# Patient Record
Sex: FEMALE | Race: WHITE | Employment: OTHER | URBAN - METROPOLITAN AREA
[De-identification: names, ages, dates, MRNs, and addresses within clinical notes are randomized per-mention and may not be internally consistent; named-entity substitution may affect disease eponyms.]

---

## 2017-01-04 ENCOUNTER — OFFICE VISIT (OUTPATIENT)
Dept: FAMILY MEDICINE CLINIC | Facility: CLINIC | Age: 82
End: 2017-01-04

## 2017-01-04 VITALS
BODY MASS INDEX: 24.49 KG/M2 | DIASTOLIC BLOOD PRESSURE: 53 MMHG | TEMPERATURE: 95 F | WEIGHT: 126.38 LBS | HEIGHT: 60.25 IN | SYSTOLIC BLOOD PRESSURE: 114 MMHG | HEART RATE: 79 BPM

## 2017-01-04 DIAGNOSIS — N30.00 ACUTE CYSTITIS WITHOUT HEMATURIA: Primary | ICD-10-CM

## 2017-01-04 DIAGNOSIS — Q23.1 BICUSPID AORTIC VALVE: ICD-10-CM

## 2017-01-04 DIAGNOSIS — K52.9 IBD (INFLAMMATORY BOWEL DISEASE): ICD-10-CM

## 2017-01-04 DIAGNOSIS — K21.9 GASTROESOPHAGEAL REFLUX DISEASE, ESOPHAGITIS PRESENCE NOT SPECIFIED: ICD-10-CM

## 2017-01-04 DIAGNOSIS — E03.9 ACQUIRED HYPOTHYROIDISM: ICD-10-CM

## 2017-01-04 DIAGNOSIS — I10 ESSENTIAL HYPERTENSION: ICD-10-CM

## 2017-01-04 DIAGNOSIS — Z98.42 HISTORY OF CATARACT SURGERY, LEFT: ICD-10-CM

## 2017-01-04 DIAGNOSIS — K52.839 MICROSCOPIC COLITIS, UNSPECIFIED MICROSCOPIC COLITIS TYPE: ICD-10-CM

## 2017-01-04 DIAGNOSIS — G98.8 NEUROLOGIC DISORDER: ICD-10-CM

## 2017-01-04 DIAGNOSIS — Z98.41 HISTORY OF CATARACT SURGERY, RIGHT: ICD-10-CM

## 2017-01-04 LAB
MULTISTIX LOT#: NORMAL NUMERIC
PH, URINE: 7 (ref 4.5–8)
SPECIFIC GRAVITY: 1.01 (ref 1–1.03)
UROBILINOGEN,SEMI-QN: 0.2 MG/DL (ref 0–1.9)

## 2017-01-04 PROCEDURE — 87086 URINE CULTURE/COLONY COUNT: CPT | Performed by: FAMILY MEDICINE

## 2017-01-04 PROCEDURE — 99204 OFFICE O/P NEW MOD 45 MIN: CPT | Performed by: FAMILY MEDICINE

## 2017-01-04 PROCEDURE — 81003 URINALYSIS AUTO W/O SCOPE: CPT | Performed by: FAMILY MEDICINE

## 2017-01-04 PROCEDURE — 87186 SC STD MICRODIL/AGAR DIL: CPT | Performed by: FAMILY MEDICINE

## 2017-01-04 PROCEDURE — 87077 CULTURE AEROBIC IDENTIFY: CPT | Performed by: FAMILY MEDICINE

## 2017-01-04 RX ORDER — SERTRALINE HYDROCHLORIDE 100 MG/1
100 TABLET, FILM COATED ORAL 2 TIMES DAILY
COMMUNITY
End: 2017-07-16

## 2017-01-04 RX ORDER — LISINOPRIL 20 MG/1
20 TABLET ORAL DAILY
COMMUNITY
End: 2017-01-25

## 2017-01-04 RX ORDER — SULFAMETHOXAZOLE AND TRIMETHOPRIM 800; 160 MG/1; MG/1
1 TABLET ORAL 2 TIMES DAILY
Qty: 14 TABLET | Refills: 0 | Status: SHIPPED | OUTPATIENT
Start: 2017-01-04 | End: 2017-01-11

## 2017-01-04 RX ORDER — CHOLESTYRAMINE 4 G/9G
4 POWDER, FOR SUSPENSION ORAL DAILY
COMMUNITY
End: 2017-11-03

## 2017-01-04 RX ORDER — LOPERAMIDE HYDROCHLORIDE 2 MG/1
2 TABLET ORAL 2 TIMES DAILY PRN
COMMUNITY

## 2017-01-04 RX ORDER — THIAMINE HCL 100 MG
1 TABLET ORAL DAILY
COMMUNITY
End: 2017-08-02

## 2017-01-04 RX ORDER — LOSARTAN POTASSIUM 50 MG/1
50 TABLET ORAL DAILY
COMMUNITY
End: 2017-01-20 | Stop reason: DRUGHIGH

## 2017-01-04 RX ORDER — FLUDROCORTISONE ACETATE 0.1 MG/1
0.1 TABLET ORAL DAILY
COMMUNITY
End: 2017-12-21

## 2017-01-04 RX ORDER — LOSARTAN POTASSIUM AND HYDROCHLOROTHIAZIDE 12.5; 5 MG/1; MG/1
1 TABLET ORAL DAILY
COMMUNITY
End: 2017-02-01 | Stop reason: ALTCHOICE

## 2017-01-04 RX ORDER — DOXEPIN HYDROCHLORIDE 50 MG/1
1 CAPSULE ORAL DAILY
COMMUNITY

## 2017-01-04 RX ORDER — OMEPRAZOLE 40 MG/1
40 CAPSULE, DELAYED RELEASE ORAL DAILY
COMMUNITY
End: 2017-01-25 | Stop reason: DRUGHIGH

## 2017-01-04 RX ORDER — BUDESONIDE 3 MG/1
CAPSULE, COATED PELLETS ORAL
COMMUNITY
End: 2017-10-07

## 2017-01-04 RX ORDER — LEVOTHYROXINE SODIUM 0.05 MG/1
50 TABLET ORAL
COMMUNITY
End: 2017-07-18

## 2017-01-04 NOTE — PROGRESS NOTES
Alva Garay is a 80year old female. HPI:     Patient is accompanied by her , she is also accompanied by her daughter who is well-known to me and is my patient as well.     This is a new patient with multiple medical problems and is new to our by mouth 2 (two) times daily as needed for Diarrhea. Disp:  Rfl:    Cholestyramine 4 GM/DOSE Oral Powder Take 2 g by mouth 2 (two) times daily with meals.  Disp:  Rfl:       Past Medical History   Diagnosis Date   • Essential hypertension 1/7/2017   • Sarita 0.0 - 1.9 mg/dL   NITRITE, URINE pos Negative   LEUKOCYTES small Negative   APPEARANCE  Clear   URINE-COLOR  Yellow   Multistix Lot# [id number] Numeric   Multistix Expiration Date 8/2017 Date   -URINE CULTURE, ROUTINE   Collection Time: 01/04/17  5:12 PM   Res Referral - In Network    6. Microscopic colitis, unspecified microscopic colitis type  As above in #5.  - Gastro Referral - In Network    7. Gastroesophageal reflux disease, esophagitis presence not specified  Continue PPI for now.   We will discuss further

## 2017-01-07 PROBLEM — K21.9 GASTROESOPHAGEAL REFLUX DISEASE: Status: ACTIVE | Noted: 2017-01-07

## 2017-01-07 PROBLEM — E03.9 ACQUIRED HYPOTHYROIDISM: Status: ACTIVE | Noted: 2017-01-07

## 2017-01-07 PROBLEM — K52.9 IBD (INFLAMMATORY BOWEL DISEASE): Status: ACTIVE | Noted: 2017-01-07

## 2017-01-07 PROBLEM — I10 ESSENTIAL HYPERTENSION: Status: ACTIVE | Noted: 2017-01-07

## 2017-01-07 PROBLEM — Q23.1 BICUSPID AORTIC VALVE: Status: ACTIVE | Noted: 2017-01-07

## 2017-01-07 PROBLEM — K52.839 MICROSCOPIC COLITIS: Status: ACTIVE | Noted: 2017-01-07

## 2017-01-09 ENCOUNTER — TELEPHONE (OUTPATIENT)
Dept: FAMILY MEDICINE CLINIC | Facility: CLINIC | Age: 82
End: 2017-01-09

## 2017-01-09 DIAGNOSIS — N30.00 ACUTE CYSTITIS WITHOUT HEMATURIA: Primary | ICD-10-CM

## 2017-01-09 NOTE — TELEPHONE ENCOUNTER
I spoke with Salo Alicia (pt's ) and informed him of test results and recommendations to repeat urine culture 2-3 days after completing antibiotics.   Salo Vargas states that pt will be done with antibiotics tomorrow and that she is feeling better, daughter will

## 2017-01-09 NOTE — TELEPHONE ENCOUNTER
----- Message from Raford Cogan, DO sent at 1/7/2017 12:12 PM CST -----  Please call patient: Urine culture is positive. The antibiotic the patient is on should be working.   Please have patient recheck urine culture 2-3 days after completing full cours

## 2017-01-11 ENCOUNTER — TELEPHONE (OUTPATIENT)
Dept: FAMILY MEDICINE CLINIC | Facility: CLINIC | Age: 82
End: 2017-01-11

## 2017-01-11 DIAGNOSIS — K52.9 IBD (INFLAMMATORY BOWEL DISEASE): ICD-10-CM

## 2017-01-11 DIAGNOSIS — R53.1 GENERALIZED WEAKNESS: Primary | ICD-10-CM

## 2017-01-11 DIAGNOSIS — R29.6 FREQUENT FALLS: ICD-10-CM

## 2017-01-11 DIAGNOSIS — K52.839 MICROSCOPIC COLITIS, UNSPECIFIED MICROSCOPIC COLITIS TYPE: ICD-10-CM

## 2017-01-11 NOTE — TELEPHONE ENCOUNTER
I know that she has a chronic lower extremity weakness due to a neuropathy/myopathy. Which she be willing to have home health come out into a fall risk assessment?

## 2017-01-11 NOTE — TELEPHONE ENCOUNTER
Spoke to pt's daughter, Alisha Rushing she will take her Mom in for the lab work soon. Also, she said she thinks it was the GI doctor that prescribed the fludrocortisone.   She has his/her contact information and will bring that tomorrow when she brings her father

## 2017-01-11 NOTE — TELEPHONE ENCOUNTER
Please check CBC, CMP, B12, iron studies for diagnoses generalized weakness and frequent falls.   What type of specialist has prescribing her fludrocortisone, endocrinologist or neurologist? And has she ever been recommended to take stress doses of it in th

## 2017-01-11 NOTE — TELEPHONE ENCOUNTER
Caregiver told daughter that her mother is falling more frequent and is very weak. She should probably have her hemoglobin tested? Pt is coming in for urine test tomorrow.   They would like to know if we could put a order in for that the blood test?

## 2017-01-11 NOTE — TELEPHONE ENCOUNTER
Spoke to pt's daughter and she was agreeable to the home health evaluation and felt that her Mom would be agreeable as well. Please advise.   Thanks

## 2017-01-13 ENCOUNTER — APPOINTMENT (OUTPATIENT)
Dept: LAB | Age: 82
End: 2017-01-13
Attending: FAMILY MEDICINE
Payer: MEDICARE

## 2017-01-13 ENCOUNTER — LAB ENCOUNTER (OUTPATIENT)
Dept: LAB | Age: 82
End: 2017-01-13
Attending: FAMILY MEDICINE
Payer: MEDICARE

## 2017-01-13 DIAGNOSIS — R53.1 GENERALIZED WEAKNESS: ICD-10-CM

## 2017-01-13 DIAGNOSIS — K52.839 MICROSCOPIC COLITIS, UNSPECIFIED MICROSCOPIC COLITIS TYPE: ICD-10-CM

## 2017-01-13 DIAGNOSIS — K52.9 IBD (INFLAMMATORY BOWEL DISEASE): ICD-10-CM

## 2017-01-13 DIAGNOSIS — R29.6 FREQUENT FALLS: ICD-10-CM

## 2017-01-13 LAB
ALBUMIN SERPL-MCNC: 3.7 G/DL (ref 3.5–4.8)
ALP LIVER SERPL-CCNC: 80 U/L (ref 55–142)
ALT SERPL-CCNC: 23 U/L (ref 14–54)
AST SERPL-CCNC: 29 U/L (ref 15–41)
BASOPHILS # BLD AUTO: 0.02 X10(3) UL (ref 0–0.1)
BASOPHILS NFR BLD AUTO: 0.5 %
BILIRUB SERPL-MCNC: 0.5 MG/DL (ref 0.1–2)
BILIRUB UR QL STRIP.AUTO: NEGATIVE
BUN BLD-MCNC: 41 MG/DL (ref 8–20)
CALCIUM BLD-MCNC: 9.4 MG/DL (ref 8.3–10.3)
CHLORIDE: 97 MMOL/L (ref 101–111)
CO2: 21 MMOL/L (ref 22–32)
COLOR UR AUTO: YELLOW
CREAT BLD-MCNC: 1.82 MG/DL (ref 0.55–1.02)
DEPRECATED HBV CORE AB SER IA-ACNC: 654 NG/ML (ref 10–291)
EOSINOPHIL # BLD AUTO: 0.06 X10(3) UL (ref 0–0.3)
EOSINOPHIL NFR BLD AUTO: 1.4 %
ERYTHROCYTE [DISTWIDTH] IN BLOOD BY AUTOMATED COUNT: 13.7 % (ref 11.5–16)
GLUCOSE BLD-MCNC: 102 MG/DL (ref 70–99)
GLUCOSE UR STRIP.AUTO-MCNC: NEGATIVE MG/DL
HAV AB SERPL IA-ACNC: 271 PG/ML (ref 193–986)
HCT VFR BLD AUTO: 31.7 % (ref 34–50)
HEMOLYSIS: 1
HGB BLD-MCNC: 11.1 G/DL (ref 12–16)
HYALINE CASTS #/AREA URNS AUTO: PRESENT /LPF
ICTERUS: 1
IMMATURE GRANULOCYTE COUNT: 0.03 X10(3) UL (ref 0–1)
IMMATURE GRANULOCYTE RATIO %: 0.7 %
IRON SATURATION: 21 % (ref 13–45)
IRON: 64 UG/DL (ref 28–170)
KETONES UR STRIP.AUTO-MCNC: NEGATIVE MG/DL
LIPEMIA: 1
LYMPHOCYTES # BLD AUTO: 1.07 X10(3) UL (ref 0.9–4)
LYMPHOCYTES NFR BLD AUTO: 24.5 %
M PROTEIN MFR SERPL ELPH: 7.1 G/DL (ref 6.1–8.3)
MCH RBC QN AUTO: 33.6 PG (ref 27–33.2)
MCHC RBC AUTO-ENTMCNC: 35 G/DL (ref 31–37)
MCV RBC AUTO: 96.1 FL (ref 81–100)
MONOCYTES # BLD AUTO: 0.4 X10(3) UL (ref 0.1–0.6)
MONOCYTES NFR BLD AUTO: 9.2 %
NEUTROPHIL ABS PRELIM: 2.78 X10 (3) UL (ref 1.3–6.7)
NEUTROPHILS # BLD AUTO: 2.78 X10(3) UL (ref 1.3–6.7)
NEUTROPHILS NFR BLD AUTO: 63.7 %
NITRITE UR QL STRIP.AUTO: NEGATIVE
PH UR STRIP.AUTO: 6 [PH] (ref 4.5–8)
PLATELET # BLD AUTO: 183 10(3)UL (ref 150–450)
POTASSIUM SERPL-SCNC: 3.2 MMOL/L (ref 3.6–5.1)
PROT UR STRIP.AUTO-MCNC: NEGATIVE MG/DL
RBC # BLD AUTO: 3.3 X10(6)UL (ref 3.8–5.1)
RBC UR QL AUTO: NEGATIVE
RED CELL DISTRIBUTION WIDTH-SD: 48.2 FL (ref 35.1–46.3)
SODIUM SERPL-SCNC: 130 MMOL/L (ref 136–144)
SP GR UR STRIP.AUTO: 1.01 (ref 1–1.03)
TOTAL IRON BINDING CAPACITY: 298 UG/DL (ref 298–536)
TRANSFERRIN: 200 MG/DL (ref 200–360)
UROBILINOGEN UR STRIP.AUTO-MCNC: <2 MG/DL
WBC # BLD AUTO: 4.4 X10(3) UL (ref 4–13)

## 2017-01-13 PROCEDURE — 83540 ASSAY OF IRON: CPT

## 2017-01-13 PROCEDURE — 85025 COMPLETE CBC W/AUTO DIFF WBC: CPT

## 2017-01-13 PROCEDURE — 36415 COLL VENOUS BLD VENIPUNCTURE: CPT

## 2017-01-13 PROCEDURE — 82728 ASSAY OF FERRITIN: CPT

## 2017-01-13 PROCEDURE — 80053 COMPREHEN METABOLIC PANEL: CPT

## 2017-01-13 PROCEDURE — 83550 IRON BINDING TEST: CPT

## 2017-01-13 PROCEDURE — 82607 VITAMIN B-12: CPT

## 2017-01-13 PROCEDURE — 81001 URINALYSIS AUTO W/SCOPE: CPT

## 2017-01-16 ENCOUNTER — TELEPHONE (OUTPATIENT)
Dept: FAMILY MEDICINE CLINIC | Facility: CLINIC | Age: 82
End: 2017-01-16

## 2017-01-16 NOTE — TELEPHONE ENCOUNTER
[facility] calling for a medication reconciliation for the patient  There are several discrepancies between our med list and the med list residential has. 1.Patient not currently taking lisinopril. Family reports that she ran out.   2.Budesonide-family reports pa

## 2017-01-18 ENCOUNTER — TELEPHONE (OUTPATIENT)
Dept: FAMILY MEDICINE CLINIC | Facility: CLINIC | Age: 82
End: 2017-01-18

## 2017-01-18 NOTE — TELEPHONE ENCOUNTER
Esha, physical therapist/[facility] was at the patients home and asked per family request if the test results were available yet and per instructions from Dr. Nico Ortiz pt is to make a follow up appt for her labs because her kidney function is abnormal and her elec

## 2017-01-19 ENCOUNTER — TELEPHONE (OUTPATIENT)
Dept: FAMILY MEDICINE CLINIC | Facility: CLINIC | Age: 82
End: 2017-01-19

## 2017-01-19 NOTE — TELEPHONE ENCOUNTER
Noted.  Will d[facility]w pt at [facility] she has scheduled to see me for 1/25/2017

## 2017-01-19 NOTE — TELEPHONE ENCOUNTER
Future Appointments  Date Time Provider Odell Sidhu   1/20/2017 2:30 PM Aditya Ramsay DO [facility] [place]   1/25/2017 10:00 AM Aditya Ramsay DO [facility] [place]

## 2017-01-19 NOTE — TELEPHONE ENCOUNTER
Please call pt, but also speak to her daughter Cedric:  Why is pt not eating and drinking? Abdominal pain? Nausea? Vomiting? Let me know, we made need to direct her to the ER.

## 2017-01-20 ENCOUNTER — OFFICE VISIT (OUTPATIENT)
Dept: FAMILY MEDICINE CLINIC | Facility: CLINIC | Age: 82
End: 2017-01-20

## 2017-01-20 VITALS — HEART RATE: 72 BPM | DIASTOLIC BLOOD PRESSURE: 62 MMHG | SYSTOLIC BLOOD PRESSURE: 94 MMHG

## 2017-01-20 DIAGNOSIS — R53.1 GENERALIZED WEAKNESS: Primary | ICD-10-CM

## 2017-01-20 DIAGNOSIS — E87.6 HYPOKALEMIA: ICD-10-CM

## 2017-01-20 DIAGNOSIS — N30.00 ACUTE CYSTITIS WITHOUT HEMATURIA: ICD-10-CM

## 2017-01-20 DIAGNOSIS — N18.9 CKD (CHRONIC KIDNEY DISEASE), UNSPECIFIED STAGE: ICD-10-CM

## 2017-01-20 PROCEDURE — 99214 OFFICE O/P EST MOD 30 MIN: CPT | Performed by: FAMILY MEDICINE

## 2017-01-20 RX ORDER — POTASSIUM CHLORIDE 1.5 G/1.77G
20 POWDER, FOR SOLUTION ORAL 2 TIMES DAILY
COMMUNITY
End: 2017-01-25

## 2017-01-20 RX ORDER — CIPROFLOXACIN 500 MG/1
500 TABLET, FILM COATED ORAL 2 TIMES DAILY
Qty: 7 TABLET | Refills: 0 | Status: SHIPPED | OUTPATIENT
Start: 2017-01-20 | End: 2017-01-25 | Stop reason: ALTCHOICE

## 2017-01-20 NOTE — PATIENT INSTRUCTIONS
-- start potassium powder 2 packets 2x/day for next 2 days, then one packet 2x/day every day  -- bring in urine sample tomorrow morning between 8-11am  -- if looks positive, we will have you start the antibiotics  -- if antibiotics started, also start prob

## 2017-01-20 NOTE — PROGRESS NOTES
Lucy Brunson is a 80year old female here for Patient presents with:   Follow - Up: abnormal labs and was told to follow up today      HPI:     ? UTI  -has been having decreased PO intake for past several weeks  -was diagnosed with klebsiella UTI 3 wks Disp: 7 tablet Rfl: 0   Losartan Potassium-HCTZ 50-12.5 MG Oral Tab Take 1 tablet by mouth daily. Disp:  Rfl:    Fludrocortisone Acetate 0.1 MG Oral Tab Take 0.1 mg by mouth daily.  Disp:  Rfl:    Budesonide 3 MG Oral Cap DR Particles Take 3 mg by mouth aguilar PCP    Hypokalemia  -start 40meq BID x 2 days, then 20meq bid     CKD  -unknown baseline, would continue to monitor closely and try to obtain previous records    Followup with PCP as planned mid next week    Meds This Visit:    Signed Prescriptions Disp Re

## 2017-01-21 ENCOUNTER — NURSE ONLY (OUTPATIENT)
Dept: FAMILY MEDICINE CLINIC | Facility: CLINIC | Age: 82
End: 2017-01-21

## 2017-01-21 DIAGNOSIS — Z87.01: Primary | ICD-10-CM

## 2017-01-21 LAB
BILIRUBIN: NEGATIVE
GLUCOSE (URINE DIPSTICK): NEGATIVE MG/DL
KETONES (URINE DIPSTICK): NEGATIVE MG/DL
LEUKOCYTES: NEGATIVE
MULTISTIX LOT#: NORMAL NUMERIC
NITRITE, URINE: NEGATIVE
OCCULT BLOOD: NEGATIVE
PH, URINE: 6.5 (ref 4.5–8)
PROTEIN (URINE DIPSTICK): NEGATIVE MG/DL
SPECIFIC GRAVITY: 1.01 (ref 1–1.03)
UROBILINOGEN,SEMI-QN: 0.2 MG/DL (ref 0–1.9)

## 2017-01-21 PROCEDURE — 87086 URINE CULTURE/COLONY COUNT: CPT | Performed by: FAMILY MEDICINE

## 2017-01-21 PROCEDURE — 81003 URINALYSIS AUTO W/O SCOPE: CPT | Performed by: FAMILY MEDICINE

## 2017-01-21 NOTE — PROGRESS NOTES
The patient's daughter, Dez Villa, dropped off her mother's urine specimen. As the results were negative, Jorge Luis Zuleta conferred with Dr. Darylene Hews about how to proceed with her plan of care. It was determined that the urine would be cultured.   A message was left f

## 2017-01-23 ENCOUNTER — TELEPHONE (OUTPATIENT)
Dept: FAMILY MEDICINE CLINIC | Facility: CLINIC | Age: 82
End: 2017-01-23

## 2017-01-24 ENCOUNTER — TELEPHONE (OUTPATIENT)
Dept: FAMILY MEDICINE CLINIC | Facility: CLINIC | Age: 82
End: 2017-01-24

## 2017-01-24 NOTE — TELEPHONE ENCOUNTER
Jesusita states that the patient has a superficial wound on her coccyx. Good [place] She would like an order to cleanse with wound ,apply a skin prep and cover with foam 2 times weekly.   Office will fax over an order for Dr Janey Weir to sign

## 2017-01-24 NOTE — PROGRESS NOTES
Quick Note:    Please let patient know urine test and culture are negative  -no need to start the antibiotics at this time  -followup with Dr. Maia Mendosa tomorrow as planned  ______

## 2017-01-24 NOTE — TELEPHONE ENCOUNTER
----- Message from Denver Uriostegui MD sent at 1/24/2017  1:51 PM CST -----  Please let patient know urine test and culture are negative  -no need to start the antibiotics at this time  -followup with Dr. Pippa Neri tomorrow as planned

## 2017-01-24 NOTE — TELEPHONE ENCOUNTER
LM on patients answering machine. Spoke with Hermelinda(per signed consent) informing her of negative urine culture. Pt has appt scheduled to follow up with Dr. Adriano Mo in the office tomorrow.

## 2017-01-25 ENCOUNTER — APPOINTMENT (OUTPATIENT)
Dept: LAB | Age: 82
End: 2017-01-25
Attending: FAMILY MEDICINE
Payer: MEDICARE

## 2017-01-25 ENCOUNTER — OFFICE VISIT (OUTPATIENT)
Dept: FAMILY MEDICINE CLINIC | Facility: CLINIC | Age: 82
End: 2017-01-25

## 2017-01-25 VITALS
HEART RATE: 96 BPM | HEIGHT: 60.25 IN | OXYGEN SATURATION: 96 % | SYSTOLIC BLOOD PRESSURE: 102 MMHG | DIASTOLIC BLOOD PRESSURE: 44 MMHG | WEIGHT: 123.38 LBS | BODY MASS INDEX: 23.91 KG/M2

## 2017-01-25 DIAGNOSIS — R63.4 UNINTENDED WEIGHT LOSS: ICD-10-CM

## 2017-01-25 DIAGNOSIS — I10 ESSENTIAL HYPERTENSION: ICD-10-CM

## 2017-01-25 DIAGNOSIS — K52.839 MICROSCOPIC COLITIS, UNSPECIFIED MICROSCOPIC COLITIS TYPE: ICD-10-CM

## 2017-01-25 DIAGNOSIS — E87.6 HYPOKALEMIA: ICD-10-CM

## 2017-01-25 DIAGNOSIS — R63.0 DECREASED APPETITE: ICD-10-CM

## 2017-01-25 DIAGNOSIS — E53.8 B12 DEFICIENCY: ICD-10-CM

## 2017-01-25 DIAGNOSIS — Z00.00 MEDICARE ANNUAL WELLNESS VISIT, INITIAL: Primary | ICD-10-CM

## 2017-01-25 DIAGNOSIS — R79.89 AZOTEMIA: ICD-10-CM

## 2017-01-25 DIAGNOSIS — D64.9 ANEMIA, UNSPECIFIED TYPE: ICD-10-CM

## 2017-01-25 LAB
BUN BLD-MCNC: 19 MG/DL (ref 8–20)
CALCIUM BLD-MCNC: 10 MG/DL (ref 8.3–10.3)
CHLORIDE: 103 MMOL/L (ref 101–111)
CO2: 21 MMOL/L (ref 22–32)
CREAT BLD-MCNC: 1.03 MG/DL (ref 0.55–1.02)
DEPRECATED HBV CORE AB SER IA-ACNC: 620.5 NG/ML (ref 10–291)
GLUCOSE BLD-MCNC: 83 MG/DL (ref 70–99)
HAV IGM SER QL: 1.7 MG/DL (ref 1.7–3)
IRON SATURATION: 22 % (ref 13–45)
IRON: 68 UG/DL (ref 28–170)
POTASSIUM SERPL-SCNC: 5.3 MMOL/L (ref 3.6–5.1)
SODIUM SERPL-SCNC: 132 MMOL/L (ref 136–144)
TOTAL IRON BINDING CAPACITY: 316 UG/DL (ref 298–536)
TRANSFERRIN: 212 MG/DL (ref 200–360)

## 2017-01-25 PROCEDURE — 80048 BASIC METABOLIC PNL TOTAL CA: CPT

## 2017-01-25 PROCEDURE — 83540 ASSAY OF IRON: CPT

## 2017-01-25 PROCEDURE — G0438 PPPS, INITIAL VISIT: HCPCS | Performed by: FAMILY MEDICINE

## 2017-01-25 PROCEDURE — 99214 OFFICE O/P EST MOD 30 MIN: CPT | Performed by: FAMILY MEDICINE

## 2017-01-25 PROCEDURE — 36415 COLL VENOUS BLD VENIPUNCTURE: CPT

## 2017-01-25 PROCEDURE — 83550 IRON BINDING TEST: CPT

## 2017-01-25 PROCEDURE — 82728 ASSAY OF FERRITIN: CPT

## 2017-01-25 PROCEDURE — 83735 ASSAY OF MAGNESIUM: CPT

## 2017-01-25 RX ORDER — POTASSIUM CHLORIDE 1.5 G/1.77G
20 POWDER, FOR SOLUTION ORAL 2 TIMES DAILY
Qty: 100 EACH | Refills: 0 | Status: SHIPPED | OUTPATIENT
Start: 2017-01-25 | End: 2017-01-26

## 2017-01-25 NOTE — PROGRESS NOTES
Spoke to ROBYN Watt with orders for pt to have a dietician come out to the home. The orders will be added.

## 2017-01-25 NOTE — PROGRESS NOTES
HPI:   Cash Muñoz is a 80year old female who presents for a Medicare Initial Annual Wellness visit (Once after 12 month Medicare anniversery) , diarrhea from microscopic colitis, fatigue, lack of appetite with weight loss, hypertension, blood test Marked as Taking for the 1/25/17 encounter (Office Visit) with Deepthi Hankins DO:  [DISCONTINUED] potassium chloride 20 MEQ Oral Powd Pack Take 20 mEq by mouth 2 (two) times daily.    Cholecalciferol (VITAMIN D) 1000 UNITS Oral Tab Take 1 capsule by mout depression or anxiety  ALL/ASTHMA: denies hx of allergy or asthma    EXAM:   /44 mmHg  Pulse 96  Ht 60.25\"  Wt 123 lb 6.4 oz  BMI 23.91 kg/m2  SpO2 96% Estimated body mass index is 23.91 kg/(m^2) as calculated from the following:    Height as of thi 0. 90-4.00 x10(3) uL 1.07   Monocytes Absolute      0.10-0.60 x10(3) uL 0.40   Eosinophils Absolute      0.00-0.30 x10(3) uL 0.06   Basophils Absolute      0.00-0.10 x10(3) uL 0.02   Immature Granulocyte Absolute      0.00-1.00 x10(3) uL 0.03   Neutrophils Pedialyte. Recheck BMP. - Basic Metabolic Panel (8) [E]; Future    3. Anemia, unspecified type  Iron studies normal.  Likely anemia of chronic disease. 4. Hypokalemia  Recheck BMP and magnesium level. Prescription for potassium supplementation.     - reasons:   Patient decided that the risks of aspirin therapy outweigh the benefits and declines aspirin therapy          Diet assessment: fair     Advanced Directive:  Living Will on file in [facility]?   Urmilawin Rojas does not have a Living Will on file in Ep Yes      Fall/Risk Assessment     Do you have 3 or more medical conditions?: 1-Yes    Have you fallen in the last 12 months?: 1-Yes    Do you accidently lose urine?: 1-Yes    Do you have difficulty seeing?: 0-No    Do you have any difficulty walking or get Colorectal Cancer Screening      Colonoscopy Screen every 10 years Colonoscopy,10 Years due on 08/19/1981 Update Health Maintenance if applicable    Flex Sigmoidoscopy Screen every 10 years No results found for this or any previous visit.  No flowsheet da Drug Serum Conc  Annually No results found for: DIGOXIN, DIG, VALP No flowsheet data found. Diabetes      HgbA1C  Annually No results found for: A1C, HGBA1C No flowsheet data found.     Creat/alb ratio  Annually      LDL  Annually No results found for

## 2017-01-25 NOTE — PATIENT INSTRUCTIONS
Recommend take OTC Vitamin B12 500 mcg twice a day. Recommend drink Pedialyte instead of plain water. Make appointment to see GI as soon as possible. Recommended Websites for Advanced Directives    [email or web address] exams and how often you need these or other tests. The frequency depends on your symptoms and your personal and family medical history.    Blood pressure measurement: all women   Clinical breast exam by your provider: at least every 3 years if you are 20 to high or you have a family history of type 2 diabetes   Chlamydia test: if you are sexually active and 22years old or younger or if you have a high risk of sexually transmitted disease (STD)   Gonorrhea and syphilis tests: if you are at high risk for these well as tetanus. If you are 72 or older, this new vaccine has not yet been approved for your age group.  Because babies are most susceptible to complications from whooping cough, Tdap is especially recommended for adults caring for children, even if it has fat and cholesterol in your diet. Include a lot of whole grains, fruits, and vegetables in your diet. Get regular physical activity or exercise. Injury prevention: Use lap and shoulder belts when you drive.  Use a helmet when you ride a motorcycle or bicy

## 2017-01-26 ENCOUNTER — TELEPHONE (OUTPATIENT)
Dept: FAMILY MEDICINE CLINIC | Facility: CLINIC | Age: 82
End: 2017-01-26

## 2017-01-26 DIAGNOSIS — E87.1 HYPONATREMIA: ICD-10-CM

## 2017-01-26 DIAGNOSIS — E87.5 HYPERKALEMIA: Primary | ICD-10-CM

## 2017-01-26 RX ORDER — POTASSIUM CHLORIDE 20 MEQ/1
20 TABLET, EXTENDED RELEASE ORAL DAILY
Qty: 30 TABLET | Refills: 0 | Status: SHIPPED | OUTPATIENT
Start: 2017-01-26 | End: 2017-05-22

## 2017-01-26 NOTE — TELEPHONE ENCOUNTER
Spoke with Hermelinda(per signed consent) informed her of test results and recommendations.   Wyefrain Logan states that when she went to get the potassium powder from the pharmacy she was informed that the cost was $182, she is wondering if there is another alternativ

## 2017-01-26 NOTE — TELEPHONE ENCOUNTER
----- Message from Rosa Blanca DO sent at 1/25/2017  8:34 PM CST -----  Please call patient's daughter Joel Camacho and be sure to speak with her directly:  Kidney function is much improved.   However she does still have an electrolyte disturbance and her po

## 2017-01-29 PROBLEM — D64.9 ANEMIA: Status: ACTIVE | Noted: 2017-01-29

## 2017-01-29 PROBLEM — E53.8 B12 DEFICIENCY: Status: ACTIVE | Noted: 2017-01-29

## 2017-01-29 PROBLEM — R63.0 DECREASED APPETITE: Status: ACTIVE | Noted: 2017-01-29

## 2017-01-29 PROBLEM — R79.89 AZOTEMIA: Status: ACTIVE | Noted: 2017-01-29

## 2017-01-29 PROBLEM — R63.4 UNINTENDED WEIGHT LOSS: Status: ACTIVE | Noted: 2017-01-29

## 2017-01-30 ENCOUNTER — TELEPHONE (OUTPATIENT)
Dept: FAMILY MEDICINE CLINIC | Facility: CLINIC | Age: 82
End: 2017-01-30

## 2017-01-30 NOTE — TELEPHONE ENCOUNTER
[facility] informed of new rx sent to local pharmacy, I advised pt to call the office if there are any concerns with the cost of the new rx. [facility] verbalized understanding and had no questions a this time.

## 2017-01-31 NOTE — TELEPHONE ENCOUNTER
left message for [place] that ok per Dr Hodan Mcdaniels to extend [facility]

## 2017-02-01 ENCOUNTER — OFFICE VISIT (OUTPATIENT)
Dept: NEUROLOGY | Facility: CLINIC | Age: 82
End: 2017-02-01

## 2017-02-01 VITALS
BODY MASS INDEX: 24 KG/M2 | DIASTOLIC BLOOD PRESSURE: 62 MMHG | WEIGHT: 123 LBS | RESPIRATION RATE: 24 BRPM | HEART RATE: 96 BPM | SYSTOLIC BLOOD PRESSURE: 90 MMHG

## 2017-02-01 DIAGNOSIS — K52.9 IBD (INFLAMMATORY BOWEL DISEASE): ICD-10-CM

## 2017-02-01 DIAGNOSIS — G62.9 NEUROPATHY: ICD-10-CM

## 2017-02-01 DIAGNOSIS — E53.8 B12 DEFICIENCY: Primary | ICD-10-CM

## 2017-02-01 DIAGNOSIS — M62.50 DENERVATION ATROPHY OF MUSCLE: ICD-10-CM

## 2017-02-01 DIAGNOSIS — R29.898 WEAKNESS OF BOTH LOWER EXTREMITIES: ICD-10-CM

## 2017-02-01 PROCEDURE — 99204 OFFICE O/P NEW MOD 45 MIN: CPT | Performed by: OTHER

## 2017-02-01 RX ORDER — LOSARTAN POTASSIUM 50 MG/1
50 TABLET ORAL DAILY
COMMUNITY
End: 2017-07-19 | Stop reason: DRUGHIGH

## 2017-02-01 NOTE — H&P
[facility] Patient / Consult Visit    Malka Leon is a 80year old female.                          Referring MD: Zafar Bustos    Patient presents with:  Neuropathy: Rm. 8: b/l legs neuropathy, which worsens every week      H Alcohol Use: Yes                Comment: 1-2 shot of Milagros at night stopped on                 01/17/17    History reviewed. No pertinent family history.     Allergies:    Ibuprofen               Unknown    Comment:possible muscle weakness  Tra RRR  LUNGS: clear to auscultation bilaterally  EXTREMITIES: no cyanosis, peripheral pulses intact    Neck: Supple; full range of motion; no carotid bruits    Mental status:  Alert and oriented to time, place, person, and situation  Speech: fluent  Language biospy  [place]:   4/30/15  Comment:   the changes of the oxidative enzyme activity in the atrophic fibers of either histochemical fiber type, rare atrophic fibers, that over-react for nonspecific esterase and the grouping of fiber types indicate denervation for potential myopathy, will send off muscle enzymes; given atypical features, send off paraneoplastic Ab, monoclonal protein study.   In addition, will plan for nerve conduction studies/EMG, in order to further evaluate.     (E53.8) B12 deficiency  (primar

## 2017-02-01 NOTE — PATIENT INSTRUCTIONS
Have labs done  Have EMG done  Have records sent to [place] office   Start B12 supplement as recommended by PCP   Discuss weaning off steroids after results obtained     After your visit at the [facility] office  today,  please direct any follow up que of the person picking up your prescription must be documented in your chart.   Scheduling Tests    If your physician has ordered radiology tests such as MRI or CT scans, do not schedule the test until this office has notified you that the test has been appr may be warmed at the clinic on the testing day. 3. Wear loose clothing or pants. You may bring a pair of shorts to wear if the legs are to be studied. 4. Let your physician know prior to testing if you are on any medications to thin your blood.

## 2017-02-03 ENCOUNTER — TELEPHONE (OUTPATIENT)
Dept: FAMILY MEDICINE CLINIC | Facility: CLINIC | Age: 82
End: 2017-02-03

## 2017-02-06 NOTE — TELEPHONE ENCOUNTER
HARIS Mc called back and left a vm that she is only looking for an OTC health stock barrier cream for this patient. Please advise.   thanks

## 2017-02-06 NOTE — TELEPHONE ENCOUNTER
As I am not familiar with a prescription barrier cream, what prescription barrier cream does the home health nurse recommend?

## 2017-02-09 ENCOUNTER — TELEPHONE (OUTPATIENT)
Dept: FAMILY MEDICINE CLINIC | Facility: CLINIC | Age: 82
End: 2017-02-09

## 2017-02-09 NOTE — TELEPHONE ENCOUNTER
Please call pt's daughter Cedric and inform her that I spoke with the home health nurse Jesusita and b/c of pt declining mood and h/o being on Buspar that a prescription for the Buspar, generic is Buspirone, has been sent to her pharmacy.   Also, order plac

## 2017-02-10 NOTE — TELEPHONE ENCOUNTER
Natalie Chang informed of new medication sent to the pharmacy and that she needs to have thyroid testing completed. She verbalized understanding and had no questions at this time.

## 2017-02-13 ENCOUNTER — TELEPHONE (OUTPATIENT)
Dept: FAMILY MEDICINE CLINIC | Facility: CLINIC | Age: 82
End: 2017-02-13

## 2017-02-13 NOTE — TELEPHONE ENCOUNTER
Pretty Davila will try to get rxs transferred from pharmacy in [place] to [place].   Advised her to contact our office if she has any problems

## 2017-02-16 ENCOUNTER — APPOINTMENT (OUTPATIENT)
Dept: LAB | Age: 82
End: 2017-02-16
Attending: INTERNAL MEDICINE
Payer: MEDICARE

## 2017-02-16 DIAGNOSIS — E87.5 HYPERKALEMIA: ICD-10-CM

## 2017-02-16 DIAGNOSIS — E03.9 ACQUIRED HYPOTHYROIDISM: ICD-10-CM

## 2017-02-16 DIAGNOSIS — E87.1 HYPONATREMIA: ICD-10-CM

## 2017-02-16 DIAGNOSIS — R19.7 DIARRHEA, UNSPECIFIED TYPE: ICD-10-CM

## 2017-02-16 DIAGNOSIS — F33.1 MAJOR DEPRESSIVE DISORDER, RECURRENT EPISODE, MODERATE WITH ANXIOUS DISTRESS (HCC): ICD-10-CM

## 2017-02-16 LAB
BUN BLD-MCNC: 18 MG/DL (ref 8–20)
C-REACTIVE PROTEIN: <0.29 MG/DL (ref ?–1)
CALCIUM BLD-MCNC: 9.6 MG/DL (ref 8.3–10.3)
CHLORIDE: 105 MMOL/L (ref 101–111)
CO2: 24 MMOL/L (ref 22–32)
CREAT BLD-MCNC: 1.11 MG/DL (ref 0.55–1.02)
FREE T4: 1.1 NG/DL (ref 0.9–1.8)
GLUCOSE BLD-MCNC: 93 MG/DL (ref 70–99)
POTASSIUM SERPL-SCNC: 4 MMOL/L (ref 3.6–5.1)
SED RATE-ML: 41 MM/HR (ref 0–25)
SODIUM SERPL-SCNC: 138 MMOL/L (ref 136–144)
TSI SER-ACNC: 3.43 MIU/ML (ref 0.35–5.5)

## 2017-02-16 PROCEDURE — 84443 ASSAY THYROID STIM HORMONE: CPT | Performed by: INTERNAL MEDICINE

## 2017-02-16 PROCEDURE — 87493 C DIFF AMPLIFIED PROBE: CPT

## 2017-02-16 PROCEDURE — 87046 STOOL CULTR AEROBIC BACT EA: CPT

## 2017-02-16 PROCEDURE — 87177 OVA AND PARASITES SMEARS: CPT

## 2017-02-16 PROCEDURE — 87045 FECES CULTURE AEROBIC BACT: CPT

## 2017-02-16 PROCEDURE — 87269 GIARDIA AG IF: CPT

## 2017-02-16 PROCEDURE — 84439 ASSAY OF FREE THYROXINE: CPT | Performed by: INTERNAL MEDICINE

## 2017-02-16 PROCEDURE — 87209 SMEAR COMPLEX STAIN: CPT

## 2017-02-16 PROCEDURE — 80048 BASIC METABOLIC PNL TOTAL CA: CPT | Performed by: INTERNAL MEDICINE

## 2017-02-16 PROCEDURE — 36415 COLL VENOUS BLD VENIPUNCTURE: CPT | Performed by: INTERNAL MEDICINE

## 2017-02-16 PROCEDURE — 85652 RBC SED RATE AUTOMATED: CPT | Performed by: INTERNAL MEDICINE

## 2017-02-16 PROCEDURE — 86140 C-REACTIVE PROTEIN: CPT | Performed by: INTERNAL MEDICINE

## 2017-02-17 ENCOUNTER — APPOINTMENT (OUTPATIENT)
Dept: LAB | Age: 82
End: 2017-02-17
Attending: INTERNAL MEDICINE
Payer: MEDICARE

## 2017-02-17 PROCEDURE — 84999 UNLISTED CHEMISTRY PROCEDURE: CPT

## 2017-02-17 PROCEDURE — 84302 ASSAY OF SWEAT SODIUM: CPT

## 2017-02-17 PROCEDURE — 82656 EL-1 FECAL QUAL/SEMIQ: CPT

## 2017-02-17 PROCEDURE — 83735 ASSAY OF MAGNESIUM: CPT

## 2017-02-17 PROCEDURE — 82705 FATS/LIPIDS FECES QUAL: CPT

## 2017-02-17 PROCEDURE — 82438 ASSAY OTHER FLUID CHLORIDES: CPT

## 2017-02-22 ENCOUNTER — APPOINTMENT (OUTPATIENT)
Dept: LAB | Age: 82
End: 2017-02-22
Payer: MEDICARE

## 2017-02-22 DIAGNOSIS — R19.7 DIARRHEA: ICD-10-CM

## 2017-02-22 LAB
ATRIAL RATE: 78 BPM
CHLORIDE: 106 MMOL/L (ref 101–111)
CO2: 25 MMOL/L (ref 22–32)
P AXIS: 49 DEGREES
P-R INTERVAL: 150 MS
POTASSIUM SERPL-SCNC: 4.1 MMOL/L (ref 3.6–5.1)
Q-T INTERVAL: 396 MS
QRS DURATION: 80 MS
QTC CALCULATION (BEZET): 451 MS
R AXIS: -32 DEGREES
SODIUM SERPL-SCNC: 139 MMOL/L (ref 136–144)
T AXIS: 60 DEGREES
VENTRICULAR RATE: 78 BPM

## 2017-02-22 PROCEDURE — 80051 ELECTROLYTE PANEL: CPT

## 2017-02-22 PROCEDURE — 93005 ELECTROCARDIOGRAM TRACING: CPT

## 2017-02-22 PROCEDURE — 93010 ELECTROCARDIOGRAM REPORT: CPT | Performed by: INTERNAL MEDICINE

## 2017-02-22 PROCEDURE — 36415 COLL VENOUS BLD VENIPUNCTURE: CPT

## 2017-02-23 ENCOUNTER — TELEPHONE (OUTPATIENT)
Dept: FAMILY MEDICINE CLINIC | Facility: CLINIC | Age: 82
End: 2017-02-23

## 2017-02-27 ENCOUNTER — OFFICE VISIT (OUTPATIENT)
Dept: FAMILY MEDICINE CLINIC | Facility: CLINIC | Age: 82
End: 2017-02-27

## 2017-02-27 ENCOUNTER — TELEPHONE (OUTPATIENT)
Dept: FAMILY MEDICINE CLINIC | Facility: CLINIC | Age: 82
End: 2017-02-27

## 2017-02-27 VITALS
WEIGHT: 121.81 LBS | SYSTOLIC BLOOD PRESSURE: 119 MMHG | HEART RATE: 96 BPM | DIASTOLIC BLOOD PRESSURE: 46 MMHG | BODY MASS INDEX: 23.6 KG/M2 | HEIGHT: 60.25 IN

## 2017-02-27 DIAGNOSIS — Z79.899 POLYPHARMACY: ICD-10-CM

## 2017-02-27 DIAGNOSIS — R89.9 ABNORMAL LABORATORY TEST RESULT: ICD-10-CM

## 2017-02-27 DIAGNOSIS — E03.9 ACQUIRED HYPOTHYROIDISM: ICD-10-CM

## 2017-02-27 DIAGNOSIS — E46 PROTEIN MALNUTRITION (HCC): ICD-10-CM

## 2017-02-27 DIAGNOSIS — I10 ESSENTIAL HYPERTENSION: Primary | ICD-10-CM

## 2017-02-27 DIAGNOSIS — N18.30 CKD (CHRONIC KIDNEY DISEASE) STAGE 3, GFR 30-59 ML/MIN (HCC): ICD-10-CM

## 2017-02-27 PROCEDURE — 99214 OFFICE O/P EST MOD 30 MIN: CPT | Performed by: FAMILY MEDICINE

## 2017-02-27 RX ORDER — CHOLESTYRAMINE 4 G/9G
2 POWDER, FOR SUSPENSION ORAL 2 TIMES DAILY PRN
Refills: 0 | Status: CANCELLED | OUTPATIENT
Start: 2017-02-27

## 2017-02-27 NOTE — PROGRESS NOTES
Alva Garay is a 80year old female. HPI:     Patient is brought in by her daughter who is pleasant and supportive. She is also accompanied by her  who has dementia.     Still some confusion about what medications are being taken and what medi Visual impairment      glasses   • Osteoarthritis      general   • Anorexia    • Essential hypertension 1/7/2017   • Bicuspid aortic valve 1/7/2017   • Acquired hypothyroidism 1/7/2017   • IBD (inflammatory bowel disease) 1/7/2017   • Microscopic colitis 1 BMI 23.60 kg/m2 Estimated body mass index is 23.6 kg/(m^2) as calculated from the following:    Height as of this encounter: 60.25\". Weight as of this encounter: 121 lb 12.8 oz. Physical Exam   Nursing note and vitals reviewed.    Constitutional: She blood pressure was below normal.  Better today. 2. Protein malnutrition ([address]  Likely multifactorial, poor intake and likely inflammatory condition and possibly poor absorption as well.     3. Abnormal laboratory test result  Pancreatic enzyme tests [facility]

## 2017-02-28 PROBLEM — N18.30 CKD (CHRONIC KIDNEY DISEASE) STAGE 3, GFR 30-59 ML/MIN (HCC): Status: ACTIVE | Noted: 2017-02-28

## 2017-02-28 PROBLEM — E46 PROTEIN MALNUTRITION (HCC): Status: ACTIVE | Noted: 2017-02-28

## 2017-03-03 ENCOUNTER — TELEPHONE (OUTPATIENT)
Dept: FAMILY MEDICINE CLINIC | Facility: CLINIC | Age: 82
End: 2017-03-03

## 2017-03-03 NOTE — TELEPHONE ENCOUNTER
Laura would like to recert the patient for [facility]. She will fax over orders  She needs clarification on medications that there is discrepancy on.  1. Was potassium d/c\"d  2.  Is patient supposed to be taking omeprazole 40 mg daily-this is not on patient's m

## 2017-03-06 ENCOUNTER — ANESTHESIA EVENT (OUTPATIENT)
Dept: ENDOSCOPY | Facility: HOSPITAL | Age: 82
End: 2017-03-06
Payer: MEDICARE

## 2017-03-06 NOTE — ANESTHESIA PREPROCEDURE EVALUATION
PRE-OP EVALUATION    Patient Name: Lisa García    Pre-op Diagnosis: DIARRHEA,ABNORMAL WEIGHT LOSS, FECAL INCONTINENCE      Procedure(s):  ESOPHAGOGASTRODUODENOSCOPY AND COLONOSCOPY        Surgeon(s) and Role:     Chung Ho MD - Primary    P hypertension and well controlled  (+) hyperlipidemia          (+) valvular problems/murmurs and AS and MS                       Endo/Other           (+) hypothyroidism    (+) anemia                   Pulmonary    Negative pulmonary ROS.

## 2017-03-07 ENCOUNTER — HOSPITAL ENCOUNTER (OUTPATIENT)
Facility: HOSPITAL | Age: 82
Setting detail: HOSPITAL OUTPATIENT SURGERY
Discharge: HOME | End: 2017-03-07
Attending: INTERNAL MEDICINE | Admitting: INTERNAL MEDICINE
Payer: MEDICARE

## 2017-03-07 ENCOUNTER — ANESTHESIA (OUTPATIENT)
Dept: ENDOSCOPY | Facility: HOSPITAL | Age: 82
End: 2017-03-07
Payer: MEDICARE

## 2017-03-07 ENCOUNTER — APPOINTMENT (OUTPATIENT)
Dept: LAB | Facility: HOSPITAL | Age: 82
End: 2017-03-07
Attending: INTERNAL MEDICINE
Payer: MEDICARE

## 2017-03-07 ENCOUNTER — SURGERY (OUTPATIENT)
Age: 82
End: 2017-03-07

## 2017-03-07 VITALS
HEART RATE: 75 BPM | BODY MASS INDEX: 22.84 KG/M2 | HEIGHT: 61 IN | TEMPERATURE: 98 F | SYSTOLIC BLOOD PRESSURE: 158 MMHG | OXYGEN SATURATION: 100 % | RESPIRATION RATE: 15 BRPM | DIASTOLIC BLOOD PRESSURE: 85 MMHG | WEIGHT: 121 LBS

## 2017-03-07 DIAGNOSIS — G62.9 NEUROPATHY: ICD-10-CM

## 2017-03-07 DIAGNOSIS — M62.50 DENERVATION ATROPHY OF MUSCLE: ICD-10-CM

## 2017-03-07 DIAGNOSIS — K52.9 IBD (INFLAMMATORY BOWEL DISEASE): ICD-10-CM

## 2017-03-07 DIAGNOSIS — R29.898 WEAKNESS OF BOTH LOWER EXTREMITIES: ICD-10-CM

## 2017-03-07 LAB — CK: 59 IU/L (ref 26–192)

## 2017-03-07 PROCEDURE — 0DBP8ZX EXCISION OF RECTUM, VIA NATURAL OR ARTIFICIAL OPENING ENDOSCOPIC, DIAGNOSTIC: ICD-10-PCS | Performed by: INTERNAL MEDICINE

## 2017-03-07 PROCEDURE — 88305 TISSUE EXAM BY PATHOLOGIST: CPT | Performed by: INTERNAL MEDICINE

## 2017-03-07 PROCEDURE — 0DBL8ZX EXCISION OF TRANSVERSE COLON, VIA NATURAL OR ARTIFICIAL OPENING ENDOSCOPIC, DIAGNOSTIC: ICD-10-PCS | Performed by: INTERNAL MEDICINE

## 2017-03-07 PROCEDURE — 88342 IMHCHEM/IMCYTCHM 1ST ANTB: CPT | Performed by: INTERNAL MEDICINE

## 2017-03-07 PROCEDURE — 0DB98ZX EXCISION OF DUODENUM, VIA NATURAL OR ARTIFICIAL OPENING ENDOSCOPIC, DIAGNOSTIC: ICD-10-PCS | Performed by: INTERNAL MEDICINE

## 2017-03-07 PROCEDURE — 36415 COLL VENOUS BLD VENIPUNCTURE: CPT

## 2017-03-07 PROCEDURE — 0DBB8ZX EXCISION OF ILEUM, VIA NATURAL OR ARTIFICIAL OPENING ENDOSCOPIC, DIAGNOSTIC: ICD-10-PCS | Performed by: INTERNAL MEDICINE

## 2017-03-07 PROCEDURE — 0DB78ZX EXCISION OF STOMACH, PYLORUS, VIA NATURAL OR ARTIFICIAL OPENING ENDOSCOPIC, DIAGNOSTIC: ICD-10-PCS | Performed by: INTERNAL MEDICINE

## 2017-03-07 PROCEDURE — 84165 PROTEIN E-PHORESIS SERUM: CPT

## 2017-03-07 PROCEDURE — 0DBE8ZX EXCISION OF LARGE INTESTINE, VIA NATURAL OR ARTIFICIAL OPENING ENDOSCOPIC, DIAGNOSTIC: ICD-10-PCS | Performed by: INTERNAL MEDICINE

## 2017-03-07 PROCEDURE — 82550 ASSAY OF CK (CPK): CPT

## 2017-03-07 PROCEDURE — 88313 SPECIAL STAINS GROUP 2: CPT | Performed by: INTERNAL MEDICINE

## 2017-03-07 PROCEDURE — 83883 ASSAY NEPHELOMETRY NOT SPEC: CPT

## 2017-03-07 PROCEDURE — 86255 FLUORESCENT ANTIBODY SCREEN: CPT

## 2017-03-07 PROCEDURE — 86334 IMMUNOFIX E-PHORESIS SERUM: CPT

## 2017-03-07 RX ORDER — SODIUM CHLORIDE, SODIUM LACTATE, POTASSIUM CHLORIDE, CALCIUM CHLORIDE 600; 310; 30; 20 MG/100ML; MG/100ML; MG/100ML; MG/100ML
INJECTION, SOLUTION INTRAVENOUS CONTINUOUS
Status: DISCONTINUED | OUTPATIENT
Start: 2017-03-07 | End: 2017-03-07

## 2017-03-07 RX ORDER — NALOXONE HYDROCHLORIDE 0.4 MG/ML
80 INJECTION, SOLUTION INTRAMUSCULAR; INTRAVENOUS; SUBCUTANEOUS AS NEEDED
Status: DISCONTINUED | OUTPATIENT
Start: 2017-03-07 | End: 2017-03-07

## 2017-03-07 NOTE — OPERATIVE REPORT
[address] Patient Status:  Hospital Outpatient Surgery    1931 MRN [id number]   [facility] ENDOSCOPY Attending Norberto Park MD   Hosp Day # 0 PCP Ann Jose DO     PREOPERATIVE DIAGNOSIS/INDICATION: Diarrhea, weigh

## 2017-03-07 NOTE — ANESTHESIA POSTPROCEDURE EVALUATION
[address] Patient Status:  Hospital Outpatient Surgery   Age/Gender 80year old female MRN [id number]   Location [address] [facility] Attending Ortiz Bains MD   Hosp Day # 0 PCP Berenice Pallas, DO       Anesthesia Post

## 2017-03-07 NOTE — INTERVAL H&P NOTE
Pre-op Diagnosis: DIARRHEA,ABNORMAL WEIGHT LOSS, FECAL INCONTINENCE      The above referenced H&P was reviewed by Matthew Cortes MD on 3/7/2017, the patient was examined and no significant changes have occurred in the patient's condition since the H&P w

## 2017-03-07 NOTE — OPERATIVE REPORT
[address] Patient Status:  Hospital Outpatient Surgery    1931 MRN [id number]   [facility] ENDOSCOPY Attending Cathy Vargas MD   Hosp Day # 0 PCP Luis Fernando Young DO     PREOPERATIVE DIAGNOSIS/INDICATION: Diarrhea, incon Internal hemorrhoids  RECOMMENDATIONS:   - Post Colonoscopy/polypectomy precautions, watch for bleeding, infection, perforation, adverse drug reactions   - Follow biopsies.     Elzbieta Salomon  3/7/2017  8:29 AM

## 2017-03-08 LAB
A/G RATIO: 1.56
ALBUMIN, SERUM: 4.03 G/DL (ref 3.5–4.8)
ALPHA-1 GLOBULIN: 0.22 G/DL (ref 0.1–0.3)
ALPHA-2 GLOBULIN: 0.75 G/DL (ref 0.6–1)
BETA GLOBULIN: 0.66 G/DL (ref 0.7–1.2)
GAMMA GLOBULIN: 0.94 G/DL (ref 0.6–1.6)
KAPPA FREE LIGHT CHAIN: 4.23 MG/DL (ref 0.33–1.94)
KAPPA/LAMBDA FLC RATIO: 1.69 (ref 0.26–1.65)
LAMBDA FREE LIGHT CHAIN: 2.5 MG/DL (ref 0.57–2.63)
TOTAL PROTEIN,SERUM: 6.6 G/DL (ref 6.1–8.3)

## 2017-03-08 NOTE — TELEPHONE ENCOUNTER
I need to speak directly with the home health nurse, please call the home health nurse and ask what time she would be able to talk on 3/9/2017.

## 2017-03-08 NOTE — TELEPHONE ENCOUNTER
lmom polly Mc RN to please call office back and give some times she would be available to talk tomorrow to Dr. Otilia Mendosa

## 2017-03-13 ENCOUNTER — LAB REQUISITION (OUTPATIENT)
Dept: LAB | Facility: HOSPITAL | Age: 82
End: 2017-03-13
Payer: MEDICARE

## 2017-03-13 DIAGNOSIS — Z87.440 PERSONAL HISTORY OF URINARY INFECTION: ICD-10-CM

## 2017-03-13 LAB
BILIRUB UR QL STRIP.AUTO: NEGATIVE
COLOR UR AUTO: YELLOW
GLUCOSE UR STRIP.AUTO-MCNC: NEGATIVE MG/DL
HYALINE CASTS #/AREA URNS AUTO: PRESENT /LPF
KETONES UR STRIP.AUTO-MCNC: NEGATIVE MG/DL
NITRITE UR QL STRIP.AUTO: POSITIVE
PH UR STRIP.AUTO: 6 [PH] (ref 4.5–8)
PROT UR STRIP.AUTO-MCNC: NEGATIVE MG/DL
RBC UR QL AUTO: NEGATIVE
SP GR UR STRIP.AUTO: 1.02 (ref 1–1.03)
UROBILINOGEN UR STRIP.AUTO-MCNC: <2 MG/DL
WBC #/AREA URNS AUTO: >50 /HPF

## 2017-03-13 PROCEDURE — 81001 URINALYSIS AUTO W/SCOPE: CPT | Performed by: FAMILY MEDICINE

## 2017-03-13 PROCEDURE — 87077 CULTURE AEROBIC IDENTIFY: CPT | Performed by: FAMILY MEDICINE

## 2017-03-13 PROCEDURE — 87086 URINE CULTURE/COLONY COUNT: CPT | Performed by: FAMILY MEDICINE

## 2017-03-13 PROCEDURE — 87186 SC STD MICRODIL/AGAR DIL: CPT | Performed by: FAMILY MEDICINE

## 2017-03-14 ENCOUNTER — TELEPHONE (OUTPATIENT)
Dept: NEUROLOGY | Facility: CLINIC | Age: 82
End: 2017-03-14

## 2017-03-14 ENCOUNTER — TELEPHONE (OUTPATIENT)
Dept: FAMILY MEDICINE CLINIC | Facility: CLINIC | Age: 82
End: 2017-03-14

## 2017-03-14 NOTE — TELEPHONE ENCOUNTER
----- Message from Sam Soriano, DO sent at 3/14/2017 12:33 AM CDT -----  Charity Alvarado, do we know why this urinalysis was done and who ordered it? Was this a clean-catch?

## 2017-03-14 NOTE — TELEPHONE ENCOUNTER
No, I do not know about this. Home health RN? Spoke to [facility] who said she spoke to [facility] and patient was having symptoms so our office ok'd this test.  Yes, this was a clean catch as well    Please advise.   thanks

## 2017-03-15 ENCOUNTER — TELEPHONE (OUTPATIENT)
Dept: FAMILY MEDICINE CLINIC | Facility: CLINIC | Age: 82
End: 2017-03-15

## 2017-03-15 RX ORDER — SULFAMETHOXAZOLE AND TRIMETHOPRIM 800; 160 MG/1; MG/1
1 TABLET ORAL 2 TIMES DAILY
Qty: 14 TABLET | Refills: 0 | Status: SHIPPED | OUTPATIENT
Start: 2017-03-15 | End: 2017-03-22

## 2017-03-15 NOTE — TELEPHONE ENCOUNTER
LM on private voicemail informing pt of positive urine results, pt advised to call the office with any questions.   RX sent to [facility]

## 2017-03-15 NOTE — TELEPHONE ENCOUNTER
----- Message from Carina Hook DO sent at 3/15/2017  5:37 PM CDT -----  Please call pt, UCX positive.  Please rx trimethoprim-sulfamethoxazole [id number], 1 tab po q 12 hours, #14, no rf

## 2017-03-22 ENCOUNTER — TELEPHONE (OUTPATIENT)
Dept: FAMILY MEDICINE CLINIC | Facility: CLINIC | Age: 82
End: 2017-03-22

## 2017-03-28 ENCOUNTER — TELEPHONE (OUTPATIENT)
Dept: NEUROLOGY | Facility: CLINIC | Age: 82
End: 2017-03-28

## 2017-03-29 ENCOUNTER — TELEPHONE (OUTPATIENT)
Dept: NEUROLOGY | Facility: CLINIC | Age: 82
End: 2017-03-29

## 2017-03-29 DIAGNOSIS — R76.8 ELEVATED SERUM IMMUNOGLOBULIN FREE LIGHT CHAIN LEVEL: Primary | ICD-10-CM

## 2017-03-29 NOTE — TELEPHONE ENCOUNTER
----- Message from Meme Bingham MD sent at 3/29/2017 12:31 PM CDT -----  Results noted, no monoclonal protein but elevated kappa/lambda ratio - refer to hematology, [facility]    Relayed above, verbalized understanding.  Requesting referral with milly

## 2017-04-06 ENCOUNTER — HOSPITAL ENCOUNTER (OUTPATIENT)
Dept: GENERAL RADIOLOGY | Age: 82
Discharge: HOME | End: 2017-04-06
Attending: INTERNAL MEDICINE
Payer: MEDICARE

## 2017-04-06 ENCOUNTER — OFFICE VISIT (OUTPATIENT)
Dept: HEMATOLOGY/ONCOLOGY | Age: 82
End: 2017-04-06
Attending: INTERNAL MEDICINE
Payer: MEDICARE

## 2017-04-06 ENCOUNTER — TELEPHONE (OUTPATIENT)
Dept: FAMILY MEDICINE CLINIC | Facility: CLINIC | Age: 82
End: 2017-04-06

## 2017-04-06 VITALS
BODY MASS INDEX: 23 KG/M2 | DIASTOLIC BLOOD PRESSURE: 81 MMHG | TEMPERATURE: 98 F | HEART RATE: 104 BPM | WEIGHT: 118.19 LBS | OXYGEN SATURATION: 98 % | RESPIRATION RATE: 18 BRPM | SYSTOLIC BLOOD PRESSURE: 153 MMHG

## 2017-04-06 DIAGNOSIS — R06.02 SHORTNESS OF BREATH: ICD-10-CM

## 2017-04-06 DIAGNOSIS — R53.83 OTHER FATIGUE: ICD-10-CM

## 2017-04-06 DIAGNOSIS — D64.9 ANEMIA, UNSPECIFIED TYPE: ICD-10-CM

## 2017-04-06 DIAGNOSIS — R63.4 WEIGHT LOSS, UNINTENTIONAL: Primary | ICD-10-CM

## 2017-04-06 DIAGNOSIS — G60.9 IDIOPATHIC PERIPHERAL NEUROPATHY: ICD-10-CM

## 2017-04-06 PROCEDURE — 71020 XR CHEST PA + LAT CHEST (CPT=71020): CPT

## 2017-04-06 PROCEDURE — 99205 OFFICE O/P NEW HI 60 MIN: CPT | Performed by: INTERNAL MEDICINE

## 2017-04-06 NOTE — TELEPHONE ENCOUNTER
Enrique Franklin from [facility] called and wants to know if pt can have a continuation of PT?    Fax#: [phone]

## 2017-04-06 NOTE — CONSULTS
[facility] Report of Consultation    Patient Name: Elizabeth Loya   YOB: 1931   Medical Record Number: [id number]   CSN: [id number]   Consulting Physician: Antoine Flood M.D.    Referring Physician: Osmany Chaparro    [facility] drawn, that was negative.       Past Medical History:  Past Medical History   Diagnosis Date   • Seizure disorder ([address]      last episode 3 years ago   • Visual impairment      glasses   • Osteoarthritis      general   • Anorexia    • Bicuspid aortic valve Drug Use: No    Sexual Activity: Not on file   Not on file  Other Topics Concern    Caffeine Concern No    Exercise Yes    Comment: stretching twice weekly     Social History Narrative    ** Merged History Encounter **           Current Medications:    Cur Hematologic/Lymphatic Normal - No easy bruising or bleeding. The patient denies any tender or palpable lymph nodes. Respiratory No chest pain, cough or hemoptysis. Cardiovascular Normal - No anginal chest pain, palpitations or orthopnea.    The [employer] malignancy. Neurologic Normal - No sensory or motor deficits, normal cerebellar function,cranial nerves intact. Psychiatric Normal - Alert and oriented times three. Coherent speech. Verbalizes understanding of our discussions today.        Laboratory: Neutrophil Abs Latest Ref Range: 1.30-6.70 x10 (3) uL 2.78   Neutrophils Absolute Latest Ref Range: 1.30-6.70 x10(3) uL 2.78   Lymphocytes Absolute Latest Ref Range: 0.90-4.00 x10(3) uL 1.07   Monocytes Absolute Latest Ref Range: 0.10-0.60 x10(3) uL 0.40 would be consistent with Anemia of chronic disease/inflammation. Will repeat the CBC, Iron studies, and B12. Will also check a folate level. If her B12 has not responded to the oral replacement, will transition to IM B12 injections.     Planned Follow Up:

## 2017-04-11 ENCOUNTER — TELEPHONE (OUTPATIENT)
Dept: FAMILY MEDICINE CLINIC | Facility: CLINIC | Age: 82
End: 2017-04-11

## 2017-04-11 NOTE — TELEPHONE ENCOUNTER
Merline Crowley from [facility] called and said there is a change in frequency for PT, it will now be twice a week for 4 weeks. She will send an order over to be signed.

## 2017-04-12 ENCOUNTER — APPOINTMENT (OUTPATIENT)
Dept: HEMATOLOGY/ONCOLOGY | Age: 82
End: 2017-04-12
Attending: INTERNAL MEDICINE
Payer: MEDICARE

## 2017-04-13 ENCOUNTER — APPOINTMENT (OUTPATIENT)
Dept: HEMATOLOGY/ONCOLOGY | Age: 82
End: 2017-04-13
Attending: INTERNAL MEDICINE
Payer: MEDICARE

## 2017-04-19 ENCOUNTER — OFFICE VISIT (OUTPATIENT)
Dept: HEMATOLOGY/ONCOLOGY | Age: 82
End: 2017-04-19
Attending: INTERNAL MEDICINE
Payer: MEDICARE

## 2017-04-19 VITALS
HEART RATE: 99 BPM | RESPIRATION RATE: 18 BRPM | DIASTOLIC BLOOD PRESSURE: 81 MMHG | SYSTOLIC BLOOD PRESSURE: 136 MMHG | OXYGEN SATURATION: 97 % | WEIGHT: 116 LBS | TEMPERATURE: 98 F | BODY MASS INDEX: 22 KG/M2

## 2017-04-19 DIAGNOSIS — R10.84 GENERALIZED ABDOMINAL PAIN: ICD-10-CM

## 2017-04-19 DIAGNOSIS — D63.8 ANEMIA, CHRONIC DISEASE: ICD-10-CM

## 2017-04-19 DIAGNOSIS — R63.4 WEIGHT LOSS, UNINTENTIONAL: Primary | ICD-10-CM

## 2017-04-19 DIAGNOSIS — R93.89 ABNORMAL CHEST X-RAY: ICD-10-CM

## 2017-04-19 PROCEDURE — 99214 OFFICE O/P EST MOD 30 MIN: CPT | Performed by: INTERNAL MEDICINE

## 2017-04-19 RX ORDER — DRONABINOL 2.5 MG/1
2.5 CAPSULE ORAL
Qty: 60 CAPSULE | Refills: 0 | Status: SHIPPED | COMMUNITY
Start: 2017-04-19 | End: 2017-05-03 | Stop reason: DRUGHIGH

## 2017-04-19 NOTE — PROGRESS NOTES
Education Record    Learner:  Patient    Disease / Diagnosis:    Barriers / Limitations:  None   Comments:    Method:  Discussion   Comments:    General Topics:  Plan of care reviewed   Comments:    Outcome:  Shows understanding   Comments:    CT Scan CAP

## 2017-04-20 ENCOUNTER — TELEPHONE (OUTPATIENT)
Dept: FAMILY MEDICINE CLINIC | Facility: CLINIC | Age: 82
End: 2017-04-20

## 2017-04-20 NOTE — TELEPHONE ENCOUNTER
[facility] wants to know if they can give her tylenol for right back pain? Or prescription for relaxing?   Please call Sheila [phone]

## 2017-04-20 NOTE — TELEPHONE ENCOUNTER
Spoke to [employer] ok for OTC ES Tylenol for her back pain as well as Flexeril because pt is doing her back exercises and is noticing some spasms. Please advise.   thanks

## 2017-05-01 ENCOUNTER — MED REC SCAN ONLY (OUTPATIENT)
Dept: FAMILY MEDICINE CLINIC | Facility: CLINIC | Age: 82
End: 2017-05-01

## 2017-05-01 ENCOUNTER — TELEPHONE (OUTPATIENT)
Dept: FAMILY MEDICINE CLINIC | Facility: CLINIC | Age: 82
End: 2017-05-01

## 2017-05-01 ENCOUNTER — HOSPITAL ENCOUNTER (OUTPATIENT)
Dept: CT IMAGING | Age: 82
Discharge: HOME | End: 2017-05-01
Attending: INTERNAL MEDICINE
Payer: MEDICARE

## 2017-05-01 DIAGNOSIS — R10.84 GENERALIZED ABDOMINAL PAIN: ICD-10-CM

## 2017-05-01 DIAGNOSIS — R63.4 WEIGHT LOSS, UNINTENTIONAL: ICD-10-CM

## 2017-05-01 DIAGNOSIS — R93.89 ABNORMAL CHEST X-RAY: ICD-10-CM

## 2017-05-01 PROCEDURE — 74177 CT ABD & PELVIS W/CONTRAST: CPT

## 2017-05-01 PROCEDURE — 71260 CT THORAX DX C+: CPT

## 2017-05-01 RX ORDER — BUSPIRONE HYDROCHLORIDE 15 MG/1
TABLET ORAL
Qty: 60 TABLET | Refills: 0 | Status: SHIPPED | OUTPATIENT
Start: 2017-05-01 | End: 2017-05-19

## 2017-05-03 ENCOUNTER — OFFICE VISIT (OUTPATIENT)
Dept: HEMATOLOGY/ONCOLOGY | Age: 82
End: 2017-05-03
Attending: INTERNAL MEDICINE
Payer: MEDICARE

## 2017-05-03 VITALS
BODY MASS INDEX: 22 KG/M2 | TEMPERATURE: 98 F | RESPIRATION RATE: 18 BRPM | HEART RATE: 97 BPM | WEIGHT: 114.38 LBS | OXYGEN SATURATION: 100 % | DIASTOLIC BLOOD PRESSURE: 75 MMHG | SYSTOLIC BLOOD PRESSURE: 123 MMHG

## 2017-05-03 DIAGNOSIS — R91.1 PULMONARY NODULE/LESION, SOLITARY: ICD-10-CM

## 2017-05-03 DIAGNOSIS — R63.4 WEIGHT LOSS, UNINTENTIONAL: ICD-10-CM

## 2017-05-03 DIAGNOSIS — D64.9 ANEMIA, UNSPECIFIED TYPE: Primary | ICD-10-CM

## 2017-05-03 DIAGNOSIS — N30.00 ACUTE CYSTITIS WITHOUT HEMATURIA: ICD-10-CM

## 2017-05-03 PROCEDURE — 99213 OFFICE O/P EST LOW 20 MIN: CPT | Performed by: INTERNAL MEDICINE

## 2017-05-03 RX ORDER — DRONABINOL 5 MG/1
5 CAPSULE ORAL
Qty: 60 CAPSULE | Refills: 0 | Status: SHIPPED | OUTPATIENT
Start: 2017-05-03 | End: 2017-05-31 | Stop reason: DRUGHIGH

## 2017-05-03 NOTE — PROGRESS NOTES
Cancer Center Progress Note  Patient Name: Hayley Jacinto   YOB: 1931   Medical Record Number: [id number]   CSN: [id number]   Attending Physician: Mello Long M.D.        Date of Visit: 5/3/2017     Chief Complaint:  Patient present paraneoplastic panel drawn, that was negative.       History of Present Illness:  Pt is here for follow up. She reports that the marinol helped little with her appetite and she continues to lose weight. CT C/A/P did not reveal evidence of malignancy.  She N/A  Number of Children: N/A     Occupational History  None on file     Social History Main Topics   Smoking status: Never Smoker     Smokeless tobacco: Never Used    Alcohol Use: Yes    Comment: 1-2 shot of Milagros at night stopped on 01/17/17    Drug Use: Comment:seizure  Ibuprofen               Unknown    Comment:possible muscle weakness  Ibuprofen                   Comment:Unknown  Tramadol                Other (See Comments)    Comment:seizure     Review of Systems:    Constitutional No fevers, chills, n discussions today. Laboratory:    Radiology:  CT C/A/P:FINDINGS:     LUNGS:  4 mm nodule in the right lower lobe (image 69) is noted. 3 mm nodule in the superior segment left lower lobe adjacent to the major fissure (image 42) is noted.  No mass or malignancy on CT. Will check repeat urine to ensure that her UTI was cleared by the Bactrim. No further workup is indicated for malignancy, at this point.   Will increase her Marinol to 5mg BID to attempt to improve her symptoms while the GI workup procee

## 2017-05-03 NOTE — PROGRESS NOTES
Education Record    Learner:  Patient, Spouse and Family Member    Disease / Diagnosis:    Barriers / Limitations:  None   Comments:    Method:  Discussion   Comments:    General Topics:  Plan of care reviewed   Comments:    Outcome:  Shows understanding

## 2017-05-03 NOTE — PROGRESS NOTES
Cancer Center Progress Note  Patient Name: Bernardo Mancilla   YOB: 1931   Medical Record Number: [id number]      Attending Physician: Sherry Perez M.D. Date of Visit: 4/19/2017     Chief Complaint:  Patient presents with:   Irwin Wallace Illness:  Pt is here for follow up. Her colonoscopy did reveal ongoing mild colitis and she was started on a steroid. This has not improved her ability to tolerate PO and her wt loss continues. Screening CXR was negative. She has had her UTI treated. History  None on file     Social History Main Topics   Smoking status: Never Smoker     Smokeless tobacco: Never Used    Alcohol Use: Yes    Comment: 1-2 shot of Milagros at night stopped on 01/17/17    Drug Use: No    Sexual Activity: Not on file   Not on f Unknown    Comment:possible muscle weakness  Ibuprofen                   Comment:Unknown  Tramadol                Other (See Comments)    Comment:seizure     Review of Systems:    Constitutional No fevers, chills, night sweats.    Eyes No significant visual Coherent speech. Verbalizes understanding of our discussions today. Laboratory:  Results for Abdulkadir Izaguirre (MRN [id number]) as of 5/3/2017 17:15   Ref.  Range 4/6/2017 13:53   Glucose Latest Ref Range: 70-99 mg/dL 104 (H)   Sodium Latest Ref [facility] Absolute Latest Ref Range: 0.90-4.00 x10(3) uL 0.95   Monocytes Absolute Latest Ref Range: 0.10-0.60 x10(3) uL 0.49   Eosinophils Absolute Latest Ref Range: 0.00-0.30 x10(3) uL 0.15   Basophils Absolute Latest Ref Range: 0.00-0.10 x10(3) uL 0.02   Immature coordination of care. The diagnosis, prognosis, and general treatment was explained to the patient and the family. Electronically Signed by: Pedro Jeffers M.D.   [facility]

## 2017-05-04 ENCOUNTER — TELEPHONE (OUTPATIENT)
Dept: HEMATOLOGY/ONCOLOGY | Facility: HOSPITAL | Age: 82
End: 2017-05-04

## 2017-05-10 ENCOUNTER — OFFICE VISIT (OUTPATIENT)
Dept: NEUROLOGY | Facility: CLINIC | Age: 82
End: 2017-05-10

## 2017-05-10 VITALS
DIASTOLIC BLOOD PRESSURE: 64 MMHG | HEART RATE: 64 BPM | BODY MASS INDEX: 22 KG/M2 | RESPIRATION RATE: 20 BRPM | WEIGHT: 115 LBS | SYSTOLIC BLOOD PRESSURE: 94 MMHG

## 2017-05-10 DIAGNOSIS — K52.9 IBD (INFLAMMATORY BOWEL DISEASE): Primary | ICD-10-CM

## 2017-05-10 DIAGNOSIS — M62.50 DENERVATION ATROPHY OF MUSCLE: ICD-10-CM

## 2017-05-10 DIAGNOSIS — R29.898 WEAKNESS OF BOTH LOWER EXTREMITIES: ICD-10-CM

## 2017-05-10 DIAGNOSIS — G62.9 NEUROPATHY: ICD-10-CM

## 2017-05-10 PROCEDURE — 99214 OFFICE O/P EST MOD 30 MIN: CPT | Performed by: OTHER

## 2017-05-10 NOTE — PATIENT INSTRUCTIONS
Please schedule EMG at earliest convenience - call [place] office to schedule  Discuss getting AFO for your foot drop with PT/ OT      After your visit at the [facility] office  today,  please direct any follow up questions or medication needs to the s the test until this office has notified you that the test has been approved by your insurer. Depending on your insurance carrier, approval may take 3-10 days.  It is highly recommended patients contact their insurance carrier directly to determine coverage prior to testing if you are on any medications to thin your blood.

## 2017-05-10 NOTE — PROGRESS NOTES
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 1) denervation Comment:seizure      Current outpatient prescriptions:   •  Pancrelipase, Lip-Prot-Amyl, (CREON) 55643 units Oral Cap DR Particles, Take 1 capsule by mouth 3 (three) times daily with meals. , Disp: , Rfl:   •  dronabinol (MARINOL) 5 MG Oral Cap, Take 1 caps rhythm  Lungs: clear to auscultation bilaterally  Extremities: no edema, peripheral pulses intact    Neck: supple, full range of motion; no carotid bruits      Neuro:  Mental status:  Orientation: Alert and oriented to person, place, time  Speech Fluent an Absolute      0.00-0.10 x10(3) uL 0.02   Immature Granulocyte Absolute      0.00-1.00 x10(3) uL 0.01   Neutrophils %       69.9   Lymphocytes %       17.6   Monocytes %       9.1   Eosinophils %       2.8   Basophils %       0.4   Immature Granulocyte % x10(3) uL    Lymphocytes Absolute      0.90-4.00 x10(3) uL    Monocytes Absolute      0.10-0.60 x10(3) uL    Eosinophils Absolute      0.00-0.30 x10(3) uL    Basophils Absolute      0.00-0.10 x10(3) uL    Immature Granulocyte Absolute      0.00-1.00 x10(3) formatting which cannot be displayed here.    Interpretation       Abnormal (A)   NEURONAL NUCLEAR AB (MARIMAR)IGG      None Detected None Detected   CK       IU/L 59   Vitamin B12      193-986 pg/mL          Imaging:  New since last visit     5/1/17: in this region these are likely chronic in nature.      4.  Diverticulosis without evidence of acute diverticulitis.        Other diagnostics as previously noted    Muscle biospy  [place]:    4/30/15  Comment:    the changes of the oxidative enzyme activity in neuropathy, as well, related to her long-standing B12 deficiency but this is normal now.      Patient was advised to continue to follow with GI for management of her inflammatory bowel disease     (K52.9) IBD (inflammatory bowel disease)  (primary encounter

## 2017-05-19 RX ORDER — BUSPIRONE HYDROCHLORIDE 15 MG/1
TABLET ORAL
Qty: 60 TABLET | Refills: 0 | Status: SHIPPED | OUTPATIENT
Start: 2017-05-19 | End: 2017-05-28

## 2017-05-23 ENCOUNTER — HOSPITAL ENCOUNTER (OUTPATIENT)
Dept: CV DIAGNOSTICS | Age: 82
Discharge: HOME | End: 2017-05-23
Attending: INTERNAL MEDICINE
Payer: MEDICARE

## 2017-05-23 DIAGNOSIS — I35.2 AORTIC VALVE STENOSIS WITH INSUFFICIENCY, UNSPECIFIED ETIOLOGY: ICD-10-CM

## 2017-05-23 DIAGNOSIS — R63.4 WEIGHT LOSS: ICD-10-CM

## 2017-05-23 DIAGNOSIS — R09.89 LEFT CAROTID BRUIT: ICD-10-CM

## 2017-05-23 RX ORDER — POTASSIUM CHLORIDE 20 MEQ/1
TABLET, EXTENDED RELEASE ORAL
Qty: 30 TABLET | Refills: 0 | Status: SHIPPED | OUTPATIENT
Start: 2017-05-23 | End: 2017-06-26

## 2017-05-29 RX ORDER — BUSPIRONE HYDROCHLORIDE 15 MG/1
TABLET ORAL
Qty: 60 TABLET | Refills: 0 | Status: SHIPPED | OUTPATIENT
Start: 2017-05-29 | End: 2017-07-16

## 2017-05-31 ENCOUNTER — TELEPHONE (OUTPATIENT)
Dept: HEMATOLOGY/ONCOLOGY | Facility: HOSPITAL | Age: 82
End: 2017-05-31

## 2017-05-31 ENCOUNTER — OFFICE VISIT (OUTPATIENT)
Dept: HEMATOLOGY/ONCOLOGY | Age: 82
End: 2017-05-31
Attending: INTERNAL MEDICINE
Payer: MEDICARE

## 2017-05-31 VITALS
SYSTOLIC BLOOD PRESSURE: 148 MMHG | OXYGEN SATURATION: 99 % | DIASTOLIC BLOOD PRESSURE: 67 MMHG | HEART RATE: 88 BPM | BODY MASS INDEX: 22 KG/M2 | TEMPERATURE: 97 F | RESPIRATION RATE: 18 BRPM | WEIGHT: 112 LBS

## 2017-05-31 DIAGNOSIS — E87.6 HYPOKALEMIA: ICD-10-CM

## 2017-05-31 DIAGNOSIS — N30.00 ACUTE CYSTITIS WITHOUT HEMATURIA: ICD-10-CM

## 2017-05-31 DIAGNOSIS — R63.4 WEIGHT LOSS: Primary | ICD-10-CM

## 2017-05-31 DIAGNOSIS — R91.1 PULMONARY NODULE: ICD-10-CM

## 2017-05-31 DIAGNOSIS — D64.9 ANEMIA, UNSPECIFIED TYPE: ICD-10-CM

## 2017-05-31 PROCEDURE — 99214 OFFICE O/P EST MOD 30 MIN: CPT | Performed by: INTERNAL MEDICINE

## 2017-05-31 RX ORDER — DRONABINOL 10 MG/1
10 CAPSULE ORAL
Qty: 60 CAPSULE | Refills: 0 | Status: SHIPPED | COMMUNITY
Start: 2017-05-31 | End: 2017-07-17

## 2017-05-31 NOTE — TELEPHONE ENCOUNTER
After MD visit daughter found out reason potassium so low because her care giver had stopped giving her potassium supplement  2 weeks ago. Instructed to take kcl bid as ordered today by MD for 2 days then take kcl 20 meq daily.  Recheck k level in one week;

## 2017-06-08 PROBLEM — I77.9 CAROTID ARTERY DISEASE (HCC): Status: ACTIVE | Noted: 2017-06-08

## 2017-06-08 NOTE — PROGRESS NOTES
Cancer Center Progress Note  Patient Name: Abundio Pompa   YOB: 1931   Medical Record Number: [id number]   CSN: [id number]   Attending Physician: Christine Carey M.D.        Date of Visit: 5/31/2017     Chief Complaint:  No chief compl Present Illness:  Pt is here for follow up. She reports that the increase in marinol helped a bit with her appetite and her weight is a bit more stable. She continues to follow up with GI regarding her colitis.       Performance Status:  ECOG 2    Past Me file     Social History Main Topics   Smoking status: Never Smoker     Smokeless tobacco: Never Used    Alcohol Use: No    Comment: 1-2 shot of Milagros at night stopped on 01/17/17    Drug Use: No    Sexual Activity: Not on file   Not on file  Other Topics Take 2 g by mouth 2 (two) times daily.   , Disp: , Rfl:     Allergies:    Ibuprofen               Unknown    Comment:possible muscle weakness  Tramadol                Other (See Comments)    Comment:seizure     Review of Systems:    Constitutional No fevers understanding of our discussions today. Laboratory:  Results for Jamison Storey (MRN [id number]) as of 6/8/2017 17:02   Ref.  Range 5/31/2017 09:32   Glucose Latest Ref Range: 70-99 mg/dL 86   Sodium Latest Ref Range: 136-144 mmol/L 141   [facility]   Eosinophils % Latest Units: % 2.7   Basophils % Latest Units: % 0.3   Immature Granulocyte % Latest Units: % 0.4     Radiology:  CT C/A/P:FINDINGS:     LUNGS:  4 mm nodule in the right lower lobe (image 69) is noted.  3 mm nodule in the superior se      Pathology:    Impression and Plan:  Wt loss: The etiology is still unclear. No evidence of malignancy on CT. UTI was cleared by the Bactrim. No further workup is indicated for malignancy, at this point.   The increase in Marinol to 5mg BID improved h

## 2017-06-12 ENCOUNTER — APPOINTMENT (OUTPATIENT)
Dept: LAB | Age: 82
End: 2017-06-12
Attending: Other
Payer: MEDICARE

## 2017-06-12 ENCOUNTER — OFFICE VISIT (OUTPATIENT)
Dept: NEUROLOGY | Facility: CLINIC | Age: 82
End: 2017-06-12

## 2017-06-12 DIAGNOSIS — R29.898 WEAKNESS OF BOTH LOWER EXTREMITIES: Primary | ICD-10-CM

## 2017-06-12 DIAGNOSIS — E87.6 HYPOKALEMIA: ICD-10-CM

## 2017-06-12 DIAGNOSIS — M62.50 DENERVATION ATROPHY OF MUSCLE: ICD-10-CM

## 2017-06-12 PROCEDURE — 95886 MUSC TEST DONE W/N TEST COMP: CPT | Performed by: OTHER

## 2017-06-12 PROCEDURE — 95909 NRV CNDJ TST 5-6 STUDIES: CPT | Performed by: OTHER

## 2017-06-12 PROCEDURE — 36415 COLL VENOUS BLD VENIPUNCTURE: CPT

## 2017-06-12 PROCEDURE — 80048 BASIC METABOLIC PNL TOTAL CA: CPT

## 2017-06-14 ENCOUNTER — TELEPHONE (OUTPATIENT)
Dept: FAMILY MEDICINE CLINIC | Facility: CLINIC | Age: 82
End: 2017-06-14

## 2017-06-14 ENCOUNTER — HOSPITAL ENCOUNTER (OUTPATIENT)
Age: 82
Discharge: HOME | End: 2017-06-14
Attending: FAMILY MEDICINE
Payer: MEDICARE

## 2017-06-14 ENCOUNTER — APPOINTMENT (OUTPATIENT)
Dept: GENERAL RADIOLOGY | Facility: HOSPITAL | Age: 82
End: 2017-06-14
Attending: EMERGENCY MEDICINE
Payer: MEDICARE

## 2017-06-14 ENCOUNTER — TELEPHONE (OUTPATIENT)
Dept: NEUROLOGY | Facility: CLINIC | Age: 82
End: 2017-06-14

## 2017-06-14 ENCOUNTER — HOSPITAL ENCOUNTER (EMERGENCY)
Facility: HOSPITAL | Age: 82
Discharge: HOME | End: 2017-06-15
Attending: EMERGENCY MEDICINE
Payer: MEDICARE

## 2017-06-14 ENCOUNTER — APPOINTMENT (OUTPATIENT)
Dept: CT IMAGING | Facility: HOSPITAL | Age: 82
End: 2017-06-14
Attending: EMERGENCY MEDICINE
Payer: MEDICARE

## 2017-06-14 VITALS
RESPIRATION RATE: 20 BRPM | SYSTOLIC BLOOD PRESSURE: 157 MMHG | OXYGEN SATURATION: 98 % | HEART RATE: 92 BPM | DIASTOLIC BLOOD PRESSURE: 72 MMHG | TEMPERATURE: 97 F

## 2017-06-14 VITALS
BODY MASS INDEX: 21.14 KG/M2 | SYSTOLIC BLOOD PRESSURE: 141 MMHG | RESPIRATION RATE: 18 BRPM | OXYGEN SATURATION: 98 % | DIASTOLIC BLOOD PRESSURE: 64 MMHG | HEIGHT: 61 IN | TEMPERATURE: 99 F | WEIGHT: 112 LBS | HEART RATE: 82 BPM

## 2017-06-14 DIAGNOSIS — R53.83 OTHER FATIGUE: Primary | ICD-10-CM

## 2017-06-14 DIAGNOSIS — R41.0 CONFUSION: Primary | ICD-10-CM

## 2017-06-14 DIAGNOSIS — R05.9 COUGH: ICD-10-CM

## 2017-06-14 DIAGNOSIS — R53.1 WEAKNESS: ICD-10-CM

## 2017-06-14 PROCEDURE — 83735 ASSAY OF MAGNESIUM: CPT | Performed by: EMERGENCY MEDICINE

## 2017-06-14 PROCEDURE — 84443 ASSAY THYROID STIM HORMONE: CPT | Performed by: EMERGENCY MEDICINE

## 2017-06-14 PROCEDURE — 80320 DRUG SCREEN QUANTALCOHOLS: CPT | Performed by: EMERGENCY MEDICINE

## 2017-06-14 PROCEDURE — 71010 XR CHEST AP PORTABLE  (CPT=71010): CPT | Performed by: EMERGENCY MEDICINE

## 2017-06-14 PROCEDURE — 93010 ELECTROCARDIOGRAM REPORT: CPT

## 2017-06-14 PROCEDURE — 93005 ELECTROCARDIOGRAM TRACING: CPT

## 2017-06-14 PROCEDURE — 81001 URINALYSIS AUTO W/SCOPE: CPT | Performed by: EMERGENCY MEDICINE

## 2017-06-14 PROCEDURE — 99285 EMERGENCY DEPT VISIT HI MDM: CPT

## 2017-06-14 PROCEDURE — 96361 HYDRATE IV INFUSION ADD-ON: CPT

## 2017-06-14 PROCEDURE — 70450 CT HEAD/BRAIN W/O DYE: CPT | Performed by: EMERGENCY MEDICINE

## 2017-06-14 PROCEDURE — 80307 DRUG TEST PRSMV CHEM ANLYZR: CPT | Performed by: EMERGENCY MEDICINE

## 2017-06-14 PROCEDURE — 96360 HYDRATION IV INFUSION INIT: CPT

## 2017-06-14 PROCEDURE — 84100 ASSAY OF PHOSPHORUS: CPT | Performed by: EMERGENCY MEDICINE

## 2017-06-14 PROCEDURE — 99215 OFFICE O/P EST HI 40 MIN: CPT

## 2017-06-14 PROCEDURE — 87086 URINE CULTURE/COLONY COUNT: CPT | Performed by: EMERGENCY MEDICINE

## 2017-06-14 PROCEDURE — 99205 OFFICE O/P NEW HI 60 MIN: CPT

## 2017-06-14 PROCEDURE — 80053 COMPREHEN METABOLIC PANEL: CPT | Performed by: EMERGENCY MEDICINE

## 2017-06-14 PROCEDURE — 85025 COMPLETE CBC W/AUTO DIFF WBC: CPT | Performed by: EMERGENCY MEDICINE

## 2017-06-14 RX ORDER — SODIUM CHLORIDE 9 MG/ML
1000 INJECTION, SOLUTION INTRAVENOUS CONTINUOUS
Status: DISCONTINUED | OUTPATIENT
Start: 2017-06-14 | End: 2017-06-15

## 2017-06-14 NOTE — TELEPHONE ENCOUNTER
Spoke with patient. States that that for the last week or so has noted that her thoughts are \"racing\" but does not feel antsy or anxious. States that she feels she needs to remember things and her mind races about that but then she forgets.  States she fe

## 2017-06-14 NOTE — TELEPHONE ENCOUNTER
Spoke with Jesusita with [facility] and relayed below. Verbalized understanding. Also discussed with patient. Verbalized understanding. No further questions at this time.

## 2017-06-14 NOTE — TELEPHONE ENCOUNTER
As per note to PCP:     \"Spoke to Jesusita, she denies patient fever, very wet cough, she looks like she was almost aspirating when she was drinking and eating fluids but when Jesusita walked in patient was lying back somewhat when she was doing these f

## 2017-06-14 NOTE — TELEPHONE ENCOUNTER
HARIS Narayanan is calling from [facility] to inform Dr. Alayna Ramsey that the patient's daughter states that the patient has been feeling depressed. HARIS Romero states that the patient confirmed that she has been feeling blue.  Oracio would lik

## 2017-06-14 NOTE — TELEPHONE ENCOUNTER
Spoke to [employer], she denies patient fever, very wet cough, she looks like she was almost aspirating when she was drinking and eating fluids but when Jesusita walked in patient was lying back somewhat when she was doing these functions so Jesusita explain

## 2017-06-14 NOTE — TELEPHONE ENCOUNTER
I spoke with [facility] (pt's daughter) and informed of that Dr. Juliane Murphy would like them to take her to [facility] in [facility] for a full workup.   I advised [facility] to explain to the doctors all the symptoms she has been experiencing toda

## 2017-06-15 NOTE — ED PROVIDER NOTES
Patient Seen in: [facility]    History   Patient presents with:  Altered Mental Status (neurologic)  Fatigue (constitutional, neurologic)    Stated Complaint: Enrique King    HPI    80year old female presents for c COLONOSCOPY N/A 3/7/2017    Comment Procedure: COLONOSCOPY;  Surgeon: Antonio Alexander MD;  Location: [facility] ENDOSCOPY       Medications :   Pancrelipase, Lip-Prot-Amyl, (CREON) 41510 units Oral Cap DR Particles,  Take by mouth.    dronabinol (MARINOL) 10 HPI.    Physical Exam     ED Triage Vitals   BP 06/14/17 2031 157/72 mmHg   Pulse 06/14/17 2031 92   Resp 06/14/17 2031 20   Temp 06/14/17 2031 96.9 °F (36.1 °C)   Temp src 06/14/17 2031 Oral   SpO2 06/14/17 2031 98 %   O2 Device 06/14/17 2031 None (Room a   Jamir  [id number] [phone]  Today  For re-check      Medications Prescribed:  Discharge Medication List as of 6/14/2017  8:46 PM

## 2017-06-15 NOTE — ED INITIAL ASSESSMENT (HPI)
Patient is an 81 yo  female sent to ED by PCP for lower lobe rales and difficulty expressing herself. Patient states that over the last 2 weeks she has felt like her mind is racing and is having trouble finding words for her thoughts.  No facial dr

## 2017-06-15 NOTE — ED INITIAL ASSESSMENT (HPI)
Pt has 24 hour nursing care  Nurse noted crackles to LLL and loose cough. Pt states no appetite  Feels increased weakness and confusion for 1 week. To room 5 per wheelchair. Unable to stand or transfer without assist.  Sin pink warm dry. No dyspnea.

## 2017-06-15 NOTE — ED NOTES
Pt is alert and oriented x 4, able to provide history both recent and long term. When asked who is the president, pt states Ariella Deleon is talking to me in my head and I just can't say his name. \" Pt has been having this problem increasing for the past 2 weeks.

## 2017-06-15 NOTE — ED PROVIDER NOTES
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          Family History   Problem Relation Age of Onset   • Stroke Father    • Diabetes Mother          Smoking Status: Never Smoker                      Smokeless Status: Never U tenderness. Dorsalis and Posterior Tibial pulses present. No clubbing. No cyanosis. No edema. SKIN EXAMINATIoN: Warm and dry with normal appearance. No rashes or lesions. NEUROLOGICAL: Lower extremity strength 3/5 bilateral lower extremity's.   Upper 3.1   EKG    Rate, intervals and axes as noted on EKG Report. Rate: 84  Rhythm: Sinus Rhythm  Reading: Q waves anteriorly. No acute changes.   Minimal voltage criteria for LVH          Ct Brain Or Head (46923)    6/14/2017  PROCEDURE:  CT BRAIN OR HEAD (7 emergency room for evaluation of slight weakness and mild confusion. She is not appear to be confused here. Her metabolic workup is negative. Chest x-ray is negative for pneumonia. Imaging of her head is negative.   Had a lengthy discussion with patient

## 2017-06-16 ENCOUNTER — TELEPHONE (OUTPATIENT)
Dept: FAMILY MEDICINE CLINIC | Facility: CLINIC | Age: 82
End: 2017-06-16

## 2017-06-16 NOTE — TELEPHONE ENCOUNTER
Jesusita,RN called and wanted to get orders for speech therapy for pt because pt seems to be having problems with swallowing lately. She was speaking to Hermelinda/pt's daughter and it was suggested if this could be ordered. Orders approved.  They will fax

## 2017-06-16 NOTE — TELEPHONE ENCOUNTER
Called and spoke with daughter concerning the events of this week. Patient evaluated in ER for confusion and possible aspiration. Daughter states patient continues to cough while drinking fluids.   Instructed to contact home health nurse to initiate speec

## 2017-06-19 NOTE — PROCEDURES
[facility]  [address] [address]  Ph: [phone]  FAX: [phone]        Full Name: Figueroa Domi Gender: Female  Patient ID: Hildegarde Benita YOB: 1931      Visit Date: 6/12/2017 11:47 L Tibial - AH      Ankle AH 5.10 6.6 100 3.91 11.9 Ankle - AH 8        Pop fossa AH 14.06 3.5 53.2 5.78 9.1 Pop fossa - Ankle 37 8.96 41       EMG         EMG Summary Table     Spontaneous MUAP Recruitment   Muscle Nerve Roots IA Fib PSW Fasc H.F.  Amp Dur. This is an abnormal study. There is electrophysiologic evidence to suggest a chronic, axonal, large fiber sensory and motor neuropathy.   There is no evidence for ongoing denervation, myopathy, or a primary demyelinating neuropathy as clinically questioned

## 2017-06-22 ENCOUNTER — TELEPHONE (OUTPATIENT)
Dept: FAMILY MEDICINE CLINIC | Facility: CLINIC | Age: 82
End: 2017-06-22

## 2017-06-22 NOTE — TELEPHONE ENCOUNTER
Laura called with patient update and request to recertify the patient for an additional 8 weeks of home health. Bree Melo states that the patient is very depressed. She would like the patient to make appt with Dr Demarco Godoy.  Will call Hermelinda,her daughter,

## 2017-06-26 RX ORDER — POTASSIUM CHLORIDE 20 MEQ/1
TABLET, EXTENDED RELEASE ORAL
Qty: 30 TABLET | Refills: 0 | Status: SHIPPED | OUTPATIENT
Start: 2017-06-26 | End: 2017-07-11

## 2017-06-27 ENCOUNTER — TELEPHONE (OUTPATIENT)
Dept: FAMILY MEDICINE CLINIC | Facility: CLINIC | Age: 82
End: 2017-06-27

## 2017-06-27 NOTE — TELEPHONE ENCOUNTER
Please direct patient to the emergency department. Patient needs further evaluation and treatment. Patient should follow-up with the neurologist turns.

## 2017-06-27 NOTE — TELEPHONE ENCOUNTER
Laura calling to report that the patient's voice is hoarse. She has coarse lung sounds in her lower left lobe. Patient is coughing a clear thick sputum. Sylvan Bamberger is wondering if you would consider prescribing an abx for the patient.   Patient also comp

## 2017-06-28 NOTE — TELEPHONE ENCOUNTER
Spoke to [facility] with instructions for pt to go to the ER.   She verbalized understanding and states will talk to patient

## 2017-07-11 ENCOUNTER — TELEPHONE (OUTPATIENT)
Dept: FAMILY MEDICINE CLINIC | Facility: CLINIC | Age: 82
End: 2017-07-11

## 2017-07-11 RX ORDER — POTASSIUM CHLORIDE 20 MEQ/1
TABLET, EXTENDED RELEASE ORAL
Qty: 90 TABLET | Refills: 0 | Status: SHIPPED | OUTPATIENT
Start: 2017-07-11 | End: 2017-10-10

## 2017-07-11 NOTE — TELEPHONE ENCOUNTER
Doc,    Pt has an appt to see you on 7/13. Would you want to discuss as well her swallowing issue then?

## 2017-07-11 NOTE — TELEPHONE ENCOUNTER
Alana Addison  (speech therapist) called and said she needs an order for a MBS (swallow study). She said pt has been coughing a lot.

## 2017-07-13 NOTE — PROGRESS NOTES
Latasha Moise is a 80year old female. HPI:     Patient is accompanied by her  and her daughter Kerry Maynard, they are both pleasant and supportive. Depression:  Per the home health nurse depression may be worsening or may have some dementia.   Dana multivitamin Oral Tab Take 1 tablet by mouth daily. Disp:  Rfl:    Loperamide HCl (IMODIUM A-D) 2 MG Oral Tab Take 2 mg by mouth 2 (two) times daily as needed for Diarrhea.  Disp:  Rfl:    Cholestyramine 4 GM/DOSE Oral Powder Take 2 g by mouth 2 (two) clement moderate aortic regurgitation   • Moderate mitral stenosis 01/14/2016    [facility]   • Osteoarthritis     general   • Osteoarthritis     generalized   • Protein malnutrition ([facility] 2/28/2017   • Seizure disorder ([facility]     last epis Exam   Nursing note and vitals reviewed. Constitutional: She appears well-developed. Frail elderly  female who is chronically ill-appearing   HENT:   Head: Normocephalic and atraumatic.    Mouth/Throat: Oropharynx is clear and moist.   Eyes: Co hypertension  Possibly overtreated, trial of decreasing losartan by taking one half tab daily. 5. Hypomagnesemia  Discussed switching to extended release or controlled release and decreasing the dose from 500 mcg to 250 mcg daily.   Her most recent petr

## 2017-07-13 NOTE — PATIENT INSTRUCTIONS
Change the Magnesium supplement to slow release or extended release and get a 250mg tab instead of the 500mg. Decrease the B 12 to 1000 mcg once a day. Decrease folate/folic acid intake. Continue Boost Plus 1-2 times a day.     Do the blood ney problems  · Conditions that lead to less saliva, such as Sjogren’s syndrome  · Mouth sores  · Parkinson disease or other neurologic conditions  · Muscular dystrophies  · Blockage in the esophagus, such as a growth from cancer  Symptoms of aspiration from d test how you swallow different types of liquids and solids. You may also need one or more tests. These can help to find the cause of your dysphagia. Tests are often very helpful in showing cases of silent aspiration.  The test may include:  · Modified deanne

## 2017-07-16 PROBLEM — F33.1 MAJOR DEPRESSIVE DISORDER, RECURRENT EPISODE, MODERATE WITH ANXIOUS DISTRESS (HCC): Status: ACTIVE | Noted: 2017-07-16

## 2017-07-17 ENCOUNTER — TELEPHONE (OUTPATIENT)
Dept: HEMATOLOGY/ONCOLOGY | Facility: HOSPITAL | Age: 82
End: 2017-07-17

## 2017-07-17 DIAGNOSIS — D64.9 ANEMIA, UNSPECIFIED TYPE: Primary | ICD-10-CM

## 2017-07-17 RX ORDER — DRONABINOL 10 MG/1
10 CAPSULE ORAL
Qty: 60 CAPSULE | Refills: 0
Start: 2017-07-17 | End: 2017-07-17

## 2017-07-17 RX ORDER — DRONABINOL 10 MG/1
10 CAPSULE ORAL
Qty: 60 CAPSULE | Refills: 0 | Status: SHIPPED | OUTPATIENT
Start: 2017-07-17 | End: 2017-08-30

## 2017-07-17 NOTE — TELEPHONE ENCOUNTER
Daughter concerned about increased weight loss of patient. States she \"ran out of Marinol last week. \" Script for Marinol will be at [facility] for ; verbalized understanding. Asking up Medical Marijuana for patient?  Instructed to go to online site an

## 2017-07-18 ENCOUNTER — TELEPHONE (OUTPATIENT)
Dept: FAMILY MEDICINE CLINIC | Facility: CLINIC | Age: 82
End: 2017-07-18

## 2017-07-18 RX ORDER — LOSARTAN POTASSIUM 50 MG/1
25 TABLET ORAL DAILY
Refills: 0 | Status: CANCELLED | OUTPATIENT
Start: 2017-07-18

## 2017-07-18 RX ORDER — FLUDROCORTISONE ACETATE 0.1 MG/1
0.1 TABLET ORAL DAILY
Qty: 90 TABLET | Refills: 1 | Status: CANCELLED | OUTPATIENT
Start: 2017-07-18

## 2017-07-18 NOTE — TELEPHONE ENCOUNTER
Patient's daughter states her mother needs refills of fludrocortisone,levothyroxine and losartan sent to [facility]. These medications were previously prescribed by her doctor in [place].     Trial dosage decrease of losartan to 25 mg.    Dr Petra Leiva

## 2017-07-18 NOTE — TELEPHONE ENCOUNTER
Pt's daughter called and said she is still waiting for Dr. Pippa Neri to take ownership of the rest of her mothers medications from her doctor in [place].

## 2017-07-19 ENCOUNTER — TELEPHONE (OUTPATIENT)
Dept: FAMILY MEDICINE CLINIC | Facility: CLINIC | Age: 82
End: 2017-07-19

## 2017-07-19 ENCOUNTER — HOSPITAL ENCOUNTER (OUTPATIENT)
Dept: GENERAL RADIOLOGY | Age: 82
Discharge: HOME | End: 2017-07-19
Attending: FAMILY MEDICINE
Payer: MEDICARE

## 2017-07-19 DIAGNOSIS — R13.10 DYSPHAGIA, UNSPECIFIED TYPE: Primary | ICD-10-CM

## 2017-07-19 DIAGNOSIS — R13.10 DYSPHAGIA, UNSPECIFIED TYPE: ICD-10-CM

## 2017-07-19 RX ORDER — LOSARTAN POTASSIUM 25 MG/1
25 TABLET ORAL DAILY
Qty: 90 TABLET | Refills: 1 | Status: SHIPPED | OUTPATIENT
Start: 2017-07-19 | End: 2017-08-02

## 2017-07-19 RX ORDER — LEVOTHYROXINE SODIUM 0.05 MG/1
50 TABLET ORAL
Qty: 90 TABLET | Refills: 1 | Status: SHIPPED | OUTPATIENT
Start: 2017-07-19 | End: 2017-12-21

## 2017-07-19 NOTE — TELEPHONE ENCOUNTER
S/w Uma Jolley. Advised her that rxs have been sent for losartan and levothyroxine. Refills of the fludrocortisone should come from GI or neuro.   Abbe Storey understanding

## 2017-07-19 NOTE — TELEPHONE ENCOUNTER
Please call pt's daughter Alexus Carlos:  A new dose of the losartan was sent so she does not have to worry about cutting it in half. At the last ov we instructed her to take 1/2 of the losartan 50mg tab. New dose of losartan sent for 25 mg daily.     Prescription

## 2017-07-21 ENCOUNTER — TELEPHONE (OUTPATIENT)
Dept: FAMILY MEDICINE CLINIC | Facility: CLINIC | Age: 82
End: 2017-07-21

## 2017-07-21 ENCOUNTER — HOSPITAL ENCOUNTER (OUTPATIENT)
Dept: GENERAL RADIOLOGY | Facility: HOSPITAL | Age: 82
Discharge: HOME | End: 2017-07-21
Attending: FAMILY MEDICINE
Payer: MEDICARE

## 2017-07-21 DIAGNOSIS — R13.10 DYSPHAGIA, UNSPECIFIED TYPE: ICD-10-CM

## 2017-07-21 PROCEDURE — 92526 ORAL FUNCTION THERAPY: CPT

## 2017-07-21 PROCEDURE — 92611 MOTION FLUOROSCOPY/SWALLOW: CPT

## 2017-07-21 PROCEDURE — 74230 X-RAY XM SWLNG FUNCJ C+: CPT | Performed by: FAMILY MEDICINE

## 2017-07-21 NOTE — PROGRESS NOTES
ADULT VIDEOFLUOROSCOPIC SWALLOWING STUDY    Admission Date: 7/21/2017  Evaluation Date: 07/21/17  Radiologist: Dr. Tova Boyce    Dear Doctor,  This letter is to inform you of [facility] Videofluoroscopic Swallowing Study results and/or plan of treatmen ml/min     Carotid artery disease (HCC)     Major depressive disorder, recurrent episode, moderate with anxious distress ([address]    Pt accompanied by daughter and spouse.   Daughter reported that patient has been coughing with thin liquids when not agreeing t • Osteoarthritis     generalized   • Protein malnutrition ([facility] 2/28/2017   • Seizure disorder ([facility]     last episode 3 years ago   • Seizure disorder (HCC)    • Visual impairment     glasses   • Visual impairment     reading glasses   • Weakness of both Aspiration:  (none)  Effectiveness: Partial (clearing with utilizing of cued double swallow)         PUREE  Triggered at: Base of tongue  Delay (seconds): none  Residue Severity, Location: Moderate;Valleculae  Cleared/Reduced with: Secondary swallow; Liquid residue with viscosity of barium, moderate amount in valleculae, mild amount in pyriform sinus. Residue partially/mostly cleared utilizing cued double swallow and/or liquid wash. Adequate epiglottic inversion and complete laryngeal closure noted.   Discus

## 2017-07-21 NOTE — TELEPHONE ENCOUNTER
Pt's just had a swollow study at [facility]. The Nurse said Sourav Else to crush all her pills. Daughter is calling asking if their are any pills that souldn't be crushed?

## 2017-07-24 NOTE — TELEPHONE ENCOUNTER
Spoke to Jennifer Schmitt with instructions and she was told by Dr. Renata Irwin this morning at the patient visit as well

## 2017-07-27 ENCOUNTER — TELEPHONE (OUTPATIENT)
Dept: FAMILY MEDICINE CLINIC | Facility: CLINIC | Age: 82
End: 2017-07-27

## 2017-07-27 DIAGNOSIS — E87.6 HYPOKALEMIA: Primary | ICD-10-CM

## 2017-07-27 NOTE — TELEPHONE ENCOUNTER
Patient was at [facility] lab in [place] to have labs completed but there are no orders in the system  Per Dr Nico Ortiz, order BMP and Mg+ level Dx: hypokalemia  Lab orders placed

## 2017-07-28 ENCOUNTER — TELEPHONE (OUTPATIENT)
Dept: FAMILY MEDICINE CLINIC | Facility: CLINIC | Age: 82
End: 2017-07-28

## 2017-07-28 NOTE — TELEPHONE ENCOUNTER
[id number] Rona Alfonso for home health nurse to tx ulcers. Okay to order supplies. Please tell Jesusita thank you.

## 2017-07-28 NOTE — TELEPHONE ENCOUNTER
Spoke to [facility] and she said that pt now has    Pressure sores stage 2 superficial, bilateral buttocks near coccyx. [id number] Rona Alfonso for wound cleanser and skin prep to vega wound area and foam dressings?   She is asking for these items    0.7 x 0.5 x 0.5 cm on r

## 2017-08-02 ENCOUNTER — OFFICE VISIT (OUTPATIENT)
Dept: NEUROLOGY | Facility: CLINIC | Age: 82
End: 2017-08-02

## 2017-08-02 VITALS
WEIGHT: 103 LBS | BODY MASS INDEX: 18.95 KG/M2 | HEIGHT: 62 IN | SYSTOLIC BLOOD PRESSURE: 112 MMHG | DIASTOLIC BLOOD PRESSURE: 62 MMHG | RESPIRATION RATE: 16 BRPM | HEART RATE: 66 BPM

## 2017-08-02 DIAGNOSIS — M62.50 DENERVATION ATROPHY OF MUSCLE: Primary | ICD-10-CM

## 2017-08-02 DIAGNOSIS — E53.8 B12 DEFICIENCY: ICD-10-CM

## 2017-08-02 PROCEDURE — 99214 OFFICE O/P EST MOD 30 MIN: CPT | Performed by: OTHER

## 2017-08-02 NOTE — PATIENT INSTRUCTIONS
Follow up as needed     Refill policies:    • Allow 2-3 business days for refills; controlled substances may take longer.   • Contact your pharmacy at least 5 days prior to running out of medication and have them send an electronic request or submit request recommended that you have a procedure or additional testing performed. [facility]) will contact your insurance carrier to obtain pre-certification or prior authorization.     Unfortunately, [employer] has seen an increase in denial of paym

## 2017-08-02 NOTE — PROGRESS NOTES
[facility] Progress Note    HPI  Patient presents with:  Weakness:  Follow up      As per my initial H&P from 2/1/17:   \"Marimar Molina is a 80year old, who presents for evaluation of neuropathy symptoms.       Patient here to neyda • Gastroesophageal reflux disease 1/7/2017   • High blood pressure    • High cholesterol    • History of kidney infection 8/2016    [place]   • Hypothyroidism    • IBD (inflammatory bowel disease) 1/7/2017   • Major depressive disorder, recurrent episode, Comment:seizure      Current Outpatient Prescriptions:   •  Levothyroxine Sodium (SYNTHROID) 50 MCG Oral Tab, Take 1 tablet (50 mcg total) by mouth before breakfast., Disp: 90 tablet, Rfl: 1  •  dronabinol (MARINOL) 10 MG Oral Cap, Take 1 capsule (10 mg Lungs: clear to auscultation bilaterally  Extremities: no edema, peripheral pulses intact    Neck: supple, full range of motion; no carotid bruits      Neuro:  Mental status:  Orientation: Alert and oriented to person, place, time  Speech Fluent and conver 0.2   Glucose      70-99 mg/dL 104 (H)   BUN      8-20 mg/dL 14   CREATININE      0.55-1.02 mg/dL 1.00   GFR      >=60 51 (L)   CALCIUM      8.3-10.3 mg/dL 9.2   ALKALINE PHOSPHATASE       U/L 85   AST (SGOT)      15-41 U/L 24   ALT (SGPT) 0. 00-1.00 x10(3) uL    Neutrophils %          Lymphocytes %          Monocytes %          Eosinophils %          Basophils %          Immature Granulocyte %          Glucose      70-99 mg/dL    BUN      8-20 mg/dL    CREATININE      0.55-1.02 mg/dL None new since last visit     5/1/17:   CT chest / abd / pelvis:     FINDINGS:     LUNGS:  4 mm nodule in the right lower lobe (image 69) is noted. 3 mm nodule in the superior segment left lower lobe adjacent to the major fissure (image 42) is noted.  No ma Nerve / Sites Rec.  Site Onset Lat Peak Lat NP Amp PP Amp Segments Distance Velocity       ms ms µV µV   cm m/s   L Sural - Ankle (Calf)      Calf Ankle NR NR NR NR Calf - Ankle 14 NR   R Sural - Ankle (Calf)      Calf Ankle NR NR NR NR Calf - Ankle 14 NR Summary:     1. Bilateral sural sensory responses were absent  2. Right tibial motor response was normal  3.  Left tibial motor response was normal.  4. Bilateral common peroneal motor responses were abnormal due to reduced amplitude with normal conduction Lisa García is  A 80year old woman with PMHx significant for HTN, hypothyroidism, inflammatory bowel disease, ?microscopic colitis as well as chronic neuropathy, who originally presented 2/2017, for evaluation of gradually worsening weakness of bilat 30 total minutes spent with patient >50% of visit was spent in counseling and coordination of care, including discussion of differential diagnosis, workup to date, and importance of continued monitoring for new or worsening symptoms as noted above; patient

## 2017-08-04 ENCOUNTER — TELEPHONE (OUTPATIENT)
Dept: FAMILY MEDICINE CLINIC | Facility: CLINIC | Age: 82
End: 2017-08-04

## 2017-08-04 NOTE — TELEPHONE ENCOUNTER
[facility] is calling to see if a referral for a psychiatrist can be put in for Shirley Valles, Dr Sj Hobbs would like her to see one and she called [facility] but they are not taking any new patients, she would like a couple of names that she can call, and to let

## 2017-08-04 NOTE — TELEPHONE ENCOUNTER
Spoke to Cedric and explained to have her call the Behavioral health number on the back of [employer] card.   Cedric verbalized understanding

## 2017-08-15 ENCOUNTER — APPOINTMENT (OUTPATIENT)
Dept: LAB | Age: 82
End: 2017-08-15
Attending: FAMILY MEDICINE
Payer: MEDICARE

## 2017-08-15 DIAGNOSIS — E87.6 HYPOKALEMIA: ICD-10-CM

## 2017-08-15 PROBLEM — R13.12 OROPHARYNGEAL DYSPHAGIA: Status: ACTIVE | Noted: 2017-08-15

## 2017-08-15 LAB
BUN BLD-MCNC: 23 MG/DL (ref 8–20)
CALCIUM BLD-MCNC: 10 MG/DL (ref 8.3–10.3)
CHLORIDE: 103 MMOL/L (ref 101–111)
CO2: 25 MMOL/L (ref 22–32)
CREAT BLD-MCNC: 0.98 MG/DL (ref 0.55–1.02)
GLUCOSE BLD-MCNC: 93 MG/DL (ref 70–99)
HAV IGM SER QL: 1.7 MG/DL (ref 1.7–3)
POTASSIUM SERPL-SCNC: 3.9 MMOL/L (ref 3.6–5.1)
SODIUM SERPL-SCNC: 135 MMOL/L (ref 136–144)

## 2017-08-15 PROCEDURE — 80048 BASIC METABOLIC PNL TOTAL CA: CPT

## 2017-08-15 PROCEDURE — 36415 COLL VENOUS BLD VENIPUNCTURE: CPT

## 2017-08-15 PROCEDURE — 83735 ASSAY OF MAGNESIUM: CPT

## 2017-08-15 NOTE — PATIENT INSTRUCTIONS
Understanding Aspiration from Dysphagia  Aspiration is when something enters your airway or lungs by accident. It may be food, liquid, or some other material. This can cause serious health problems, such as pneumonia.  Aspiration can happen when you have · Pain when swallowing  · Difficulty starting a swallow  · Coughing or wheezing after eating  · Chest discomfort or heartburn  · Fever 30 minutes to an hour after eating  · Too much saliva  · Feeling congested after eating or drinking  · Having a wet-sound · Pharyngeal manometry, to check the pressure inside your esophagus  Date Last Reviewed: 3/19/2015  © 8319-5220 [address], [address] [place] [address]. All rights reserved.  This information is not intended as a substitute for

## 2017-08-15 NOTE — PROGRESS NOTES
Iraj Calhoun is a 80year old female. HPI:     Patient is accompanied by her  and her daughter Carlos Dalton, they are both pleasant and supportive. Depression:  Has appt to see psych Domenica later this month in [address] .   She is taking the buspir 0.1 MG Oral Tab Take 0.1 mg by mouth daily. Disp:  Rfl:    Budesonide 3 MG Oral Cap DR Particles Take 1 tablet daily  Disp:  Rfl:    multivitamin Oral Tab Take 1 tablet by mouth daily.  Disp:  Rfl:    Loperamide HCl (IMODIUM A-D) 2 MG Oral Tab Take 2 mg by Moderate aortic stenosis 01/14/2016    at least moderate aortic regurgitation   • Moderate mitral stenosis 01/14/2016    [facility]   • Osteoarthritis     general   • Osteoarthritis     generalized   • Protein malnutrition ([facility] [address] 2/ calculated from the following:    Height as of this encounter: 62\". Weight as of this encounter: 104 lb 9.6 oz. Physical Exam   Nursing note and vitals reviewed. Constitutional: She appears well-developed.    Frail elderly  female who is chr months (around 11/15/2017) for Chronic Conditions and as needed.

## 2017-08-24 ENCOUNTER — TELEPHONE (OUTPATIENT)
Dept: FAMILY MEDICINE CLINIC | Facility: CLINIC | Age: 82
End: 2017-08-24

## 2017-08-24 NOTE — TELEPHONE ENCOUNTER
JesusitaRN called in and said that there was findings of crackles of left lower lobe in patient. Denies fever, coughing, any shortness of breath.   After discussing with Dr. Mirza Mccullough if [facility] could visit patient on Monday 8/28/17 as she would be out the

## 2017-08-30 ENCOUNTER — OFFICE VISIT (OUTPATIENT)
Dept: HEMATOLOGY/ONCOLOGY | Age: 82
End: 2017-08-30
Attending: INTERNAL MEDICINE
Payer: MEDICARE

## 2017-08-30 VITALS
BODY MASS INDEX: 19 KG/M2 | RESPIRATION RATE: 18 BRPM | DIASTOLIC BLOOD PRESSURE: 77 MMHG | SYSTOLIC BLOOD PRESSURE: 162 MMHG | HEART RATE: 102 BPM | OXYGEN SATURATION: 99 % | WEIGHT: 103.63 LBS | TEMPERATURE: 98 F

## 2017-08-30 DIAGNOSIS — R91.1 LUNG NODULE < 6CM ON CT: Primary | ICD-10-CM

## 2017-08-30 DIAGNOSIS — D64.9 ANEMIA, UNSPECIFIED TYPE: ICD-10-CM

## 2017-08-30 DIAGNOSIS — N18.30 CKD (CHRONIC KIDNEY DISEASE) STAGE 3, GFR 30-59 ML/MIN (HCC): ICD-10-CM

## 2017-08-30 DIAGNOSIS — R63.4 WEIGHT LOSS: ICD-10-CM

## 2017-08-30 LAB
ALBUMIN SERPL-MCNC: 3.5 G/DL (ref 3.5–4.8)
ALP LIVER SERPL-CCNC: 62 U/L (ref 55–142)
ALT SERPL-CCNC: 17 U/L (ref 14–54)
AST SERPL-CCNC: 17 U/L (ref 15–41)
BASOPHILS # BLD AUTO: 0.02 X10(3) UL (ref 0–0.1)
BASOPHILS NFR BLD AUTO: 0.3 %
BILIRUB SERPL-MCNC: 0.3 MG/DL (ref 0.1–2)
BUN BLD-MCNC: 17 MG/DL (ref 8–20)
CALCIUM BLD-MCNC: 9.3 MG/DL (ref 8.3–10.3)
CHLORIDE: 104 MMOL/L (ref 101–111)
CO2: 23 MMOL/L (ref 22–32)
CREAT BLD-MCNC: 0.95 MG/DL (ref 0.55–1.02)
EOSINOPHIL # BLD AUTO: 0.14 X10(3) UL (ref 0–0.3)
EOSINOPHIL NFR BLD AUTO: 2.1 %
ERYTHROCYTE [DISTWIDTH] IN BLOOD BY AUTOMATED COUNT: 14.3 % (ref 11.5–16)
GLUCOSE BLD-MCNC: 97 MG/DL (ref 70–99)
HCT VFR BLD AUTO: 34.2 % (ref 34–50)
HGB BLD-MCNC: 11.5 G/DL (ref 12–16)
IMMATURE GRANULOCYTE COUNT: 0.06 X10(3) UL (ref 0–1)
IMMATURE GRANULOCYTE RATIO %: 0.9 %
LYMPHOCYTES # BLD AUTO: 1.16 X10(3) UL (ref 0.9–4)
LYMPHOCYTES NFR BLD AUTO: 17.7 %
M PROTEIN MFR SERPL ELPH: 7.4 G/DL (ref 6.1–8.3)
MCH RBC QN AUTO: 32.8 PG (ref 27–33.2)
MCHC RBC AUTO-ENTMCNC: 33.6 G/DL (ref 31–37)
MCV RBC AUTO: 97.4 FL (ref 81–100)
MONOCYTES # BLD AUTO: 0.63 X10(3) UL (ref 0.1–0.6)
MONOCYTES NFR BLD AUTO: 9.6 %
NEUTROPHIL ABS PRELIM: 4.54 X10 (3) UL (ref 1.3–6.7)
NEUTROPHILS # BLD AUTO: 4.54 X10(3) UL (ref 1.3–6.7)
NEUTROPHILS NFR BLD AUTO: 69.4 %
PLATELET # BLD AUTO: 152 10(3)UL (ref 150–450)
POTASSIUM SERPL-SCNC: 3.5 MMOL/L (ref 3.6–5.1)
RBC # BLD AUTO: 3.51 X10(6)UL (ref 3.8–5.1)
RED CELL DISTRIBUTION WIDTH-SD: 51.7 FL (ref 35.1–46.3)
SODIUM SERPL-SCNC: 135 MMOL/L (ref 136–144)
WBC # BLD AUTO: 6.6 X10(3) UL (ref 4–13)

## 2017-08-30 PROCEDURE — 99213 OFFICE O/P EST LOW 20 MIN: CPT | Performed by: INTERNAL MEDICINE

## 2017-08-30 RX ORDER — DRONABINOL 10 MG/1
10 CAPSULE ORAL
Qty: 60 CAPSULE | Refills: 0 | Status: SHIPPED | COMMUNITY
Start: 2017-08-30 | End: 2017-12-21

## 2017-08-30 NOTE — PROGRESS NOTES
Cancer Center Progress Note  Patient Name: Rubina George   YOB: 1931   Medical Record Number: [id number]   CSN: [id number]   Attending Physician: Nohelia Hardin M.D.        Date of Visit: 8/30/2017    Chief Complaint:  No chief compla marinol, with some stabilization of her weight. History of Present Illness:  Pt is here for follow up. She is losing weight again. No F/C/NS. No palpable masses. She is tolerating the Marinol well, but her appetite is still poor.      Performance Statu History  Social History   Marital status:   Spouse name: N/A    Years of education: N/A  Number of children: N/A     Occupational History  None on file     Social History Main Topics   Smoking status: Never Smoker    Smokeless tobacco: Never Used mouth 2 (two) times daily as needed for Diarrhea., Disp: , Rfl:   •  Cholestyramine 4 GM/DOSE Oral Powder, Take 4 g by mouth 3 (three) times daily.   , Disp: , Rfl:     Allergies:    Ibuprofen               Unknown    Comment:possible muscle weakness  Robert Extremities Normal - No visible deformities, no cyanosis, clubbing or edema. Integumentary Normal - No rashes, No Jaundice   Neurologic Normal - No sensory or motor deficits, normal cerebellar function, normal gait, cranial nerves intact.    Psychiatri x10 (3) uL 4.54   Neutrophils Absolute Latest Ref Range: 1.30 - 6.70 x10(3) uL 4.54   Lymphocytes Absolute Latest Ref Range: 0.90 - 4.00 x10(3) uL 1.16   Monocytes Absolute Latest Ref Range: 0.10 - 0.60 x10(3) uL 0.63 (H)   Eosinophils Absolute Latest Ref

## 2017-09-01 LAB
A/G RATIO: 1.58
ALBUMIN, SERUM: 4.35 G/DL (ref 3.5–4.8)
ALPHA-1 GLOBULIN: 0.21 G/DL (ref 0.1–0.3)
ALPHA-2 GLOBULIN: 0.76 G/DL (ref 0.6–1)
BETA GLOBULIN: 0.7 G/DL (ref 0.7–1.2)
GAMMA GLOBULIN: 1.09 G/DL (ref 0.6–1.6)
KAPPA FREE LIGHT CHAIN: 6.25 MG/DL (ref 0.33–1.94)
KAPPA/LAMBDA FLC RATIO: 1.69 (ref 0.26–1.65)
LAMBDA FREE LIGHT CHAIN: 3.7 MG/DL (ref 0.57–2.63)
TOTAL PROTEIN,SERUM: 7.1 G/DL (ref 6.1–8.3)

## 2017-09-09 ENCOUNTER — TELEPHONE (OUTPATIENT)
Dept: FAMILY MEDICINE CLINIC | Facility: CLINIC | Age: 82
End: 2017-09-09

## 2017-09-09 NOTE — TELEPHONE ENCOUNTER
A medical record request was received from [facility] and they are looking for all medical records for continuation of care. It was sent ot [employer] on 9/11/17 in a green bag.

## 2017-09-21 ENCOUNTER — OFFICE VISIT (OUTPATIENT)
Dept: FAMILY MEDICINE CLINIC | Facility: CLINIC | Age: 82
End: 2017-09-21

## 2017-09-21 VITALS
WEIGHT: 105 LBS | SYSTOLIC BLOOD PRESSURE: 110 MMHG | DIASTOLIC BLOOD PRESSURE: 78 MMHG | RESPIRATION RATE: 18 BRPM | OXYGEN SATURATION: 97 % | BODY MASS INDEX: 19 KG/M2 | HEART RATE: 103 BPM

## 2017-09-21 DIAGNOSIS — Z23 NEED FOR VACCINATION: ICD-10-CM

## 2017-09-21 DIAGNOSIS — R13.12 OROPHARYNGEAL DYSPHAGIA: ICD-10-CM

## 2017-09-21 DIAGNOSIS — R05.9 COUGH: Primary | ICD-10-CM

## 2017-09-21 PROCEDURE — 99214 OFFICE O/P EST MOD 30 MIN: CPT | Performed by: FAMILY MEDICINE

## 2017-09-21 PROCEDURE — 90653 IIV ADJUVANT VACCINE IM: CPT | Performed by: FAMILY MEDICINE

## 2017-09-21 PROCEDURE — G0008 ADMIN INFLUENZA VIRUS VAC: HCPCS | Performed by: FAMILY MEDICINE

## 2017-09-21 RX ORDER — OLANZAPINE 2.5 MG/1
TABLET ORAL
Refills: 0 | COMMUNITY
Start: 2017-09-01 | End: 2017-12-21

## 2017-09-21 NOTE — PROGRESS NOTES
Lucy Brunson is a 80year old female. HPI:     Patient is accompanied by her daughter Leonora Vital and her  Wagner Barreto, they are pleasant and supportive. Cough:  Dry to moist.  Sometimes productive clear to white sputum, small amount.   Denies shortness Gastroesophageal reflux disease     Denervation atrophy of muscle     Azotemia     Anemia     B12 deficiency     Decreased appetite     Unintended weight loss     Protein malnutrition (HCC)     CKD (chronic kidney disease) stage 3, GFR 30-59 ml/min     Car Milo Hammer MD;  Location: [facility] ENDOSCOPY  1982: LAMINECTOMY  1950: REMOVAL GALLBLADDER   Social History:  Smoking status: Never Smoker                                                              Smokeless tobacco: Never Used                      Alcohol use: patient's family with greater than 50% of time spent counseling on possible causes of the cough, importance of following swallow precautions. 1. Cough  Slowly improving. Exam negative for pneumonia. Call or return to clinic if worsening.     2. Nannie Salts

## 2017-09-25 ENCOUNTER — MED REC SCAN ONLY (OUTPATIENT)
Dept: FAMILY MEDICINE CLINIC | Facility: CLINIC | Age: 82
End: 2017-09-25

## 2017-09-25 ENCOUNTER — TELEPHONE (OUTPATIENT)
Dept: FAMILY MEDICINE CLINIC | Facility: CLINIC | Age: 82
End: 2017-09-25

## 2017-09-25 NOTE — TELEPHONE ENCOUNTER
Faxed signed [phone] and Plan of Care to Hussain Melchor.     Certification Period 9/10/2017 - 11/8/2017

## 2017-10-06 ENCOUNTER — TELEPHONE (OUTPATIENT)
Dept: FAMILY MEDICINE CLINIC | Facility: CLINIC | Age: 82
End: 2017-10-06

## 2017-10-06 DIAGNOSIS — R30.0 DYSURIA: Primary | ICD-10-CM

## 2017-10-06 NOTE — TELEPHONE ENCOUNTER
On call, home health called with daughter saying that patient had frequent urination last night. No fever or chills, no nausea or vomiting, no flank/abdominal/pelvic pain, no vag bleeding/discharge.  They would like visiting nurse over weekend to assess if

## 2017-10-07 ENCOUNTER — LAB REQUISITION (OUTPATIENT)
Dept: LAB | Age: 82
End: 2017-10-07
Attending: FAMILY MEDICINE
Payer: MEDICARE

## 2017-10-07 DIAGNOSIS — Z87.440 PERSONAL HISTORY OF URINARY INFECTION: ICD-10-CM

## 2017-10-07 PROCEDURE — 87077 CULTURE AEROBIC IDENTIFY: CPT | Performed by: FAMILY MEDICINE

## 2017-10-07 PROCEDURE — 87186 SC STD MICRODIL/AGAR DIL: CPT | Performed by: FAMILY MEDICINE

## 2017-10-07 PROCEDURE — 81001 URINALYSIS AUTO W/SCOPE: CPT | Performed by: FAMILY MEDICINE

## 2017-10-07 PROCEDURE — 87086 URINE CULTURE/COLONY COUNT: CPT | Performed by: FAMILY MEDICINE

## 2017-10-08 DIAGNOSIS — N30.01 ACUTE CYSTITIS WITH HEMATURIA: Primary | ICD-10-CM

## 2017-10-08 RX ORDER — SULFAMETHOXAZOLE AND TRIMETHOPRIM 800; 160 MG/1; MG/1
1 TABLET ORAL 2 TIMES DAILY
Qty: 14 TABLET | Refills: 0 | Status: SHIPPED | OUTPATIENT
Start: 2017-10-08 | End: 2017-10-15

## 2017-10-08 NOTE — PROGRESS NOTES
Spoke with Randi from home health, patient c/o dysuria with low grade fever (99.8F) and positive Ua. She has hx of UTI previously treated well with Bactrim. Urine culture sent. Will treat with bactrim BID for one week.   Red flags d/w nursing and fam

## 2017-10-09 ENCOUNTER — TELEPHONE (OUTPATIENT)
Dept: FAMILY MEDICINE CLINIC | Facility: CLINIC | Age: 82
End: 2017-10-09

## 2017-10-09 NOTE — TELEPHONE ENCOUNTER
Yoon from [facility] called and wants to know if teat results came back yet for culture and sensitivity. She also wants to know if there had been any changes in medication.

## 2017-10-10 NOTE — TELEPHONE ENCOUNTER
Dr. Dionne Sandhu can you take a look at this and let know if any changes. The culture results are in the chart.   Thanks  Looks like pt started Bactrim DS on 10/8/17

## 2017-10-10 NOTE — TELEPHONE ENCOUNTER
Spoke to [employer] with instructions and she also wanted to have PT ordered because pt has been complaining about not being able to get out of her chair easily and Jesusita notices some skin breakdown so orders approved for PT

## 2017-10-11 RX ORDER — POTASSIUM CHLORIDE 20 MEQ/1
TABLET, EXTENDED RELEASE ORAL
Qty: 90 TABLET | Refills: 0 | Status: SHIPPED | OUTPATIENT
Start: 2017-10-11 | End: 2018-01-09

## 2017-10-19 ENCOUNTER — TELEPHONE (OUTPATIENT)
Dept: FAMILY MEDICINE CLINIC | Facility: CLINIC | Age: 82
End: 2017-10-19

## 2017-10-26 NOTE — TELEPHONE ENCOUNTER
Successfully faxed Home Health Orders to [employer] at [phone].     Order Number: [id number], [id number], [id number], [id number], [id number], [id number], [id number], [id number]     Certification Period: 9/10/2017-11/8/2017

## 2017-10-27 ENCOUNTER — TELEPHONE (OUTPATIENT)
Dept: FAMILY MEDICINE CLINIC | Facility: CLINIC | Age: 82
End: 2017-10-27

## 2017-10-27 NOTE — TELEPHONE ENCOUNTER
Jan Bray, home health physical therapist, is calling to verify if we have received a message from the pt's daughter informing us that Trell Stoner fell 10/21/17. Jan Bray states that the pt told her that she fell and hit her hear on the TV stand.   However the ca

## 2017-11-01 ENCOUNTER — TELEPHONE (OUTPATIENT)
Dept: FAMILY MEDICINE CLINIC | Facility: CLINIC | Age: 82
End: 2017-11-01

## 2017-11-01 NOTE — TELEPHONE ENCOUNTER
Jesusita from home health is calling to request recertification for Lisa Sawyer so that she can address pain, bowel concerns and skin integrity.   She would also like verbal consent to place an order for barrier cream for a sore that is Lias Sawyer has on her b

## 2017-11-09 NOTE — TELEPHONE ENCOUNTER
HARIS Mc called from [facility] to give an update that pt continues not to eat. Pt stating she has no appetite. Liquid input is minimal as well. Pt has been doing couple shots daily of whiskey. Daughter is aware.   Pt's  seems to be handling patient' sep 2017

## 2017-11-15 ENCOUNTER — TELEPHONE (OUTPATIENT)
Dept: FAMILY MEDICINE CLINIC | Facility: CLINIC | Age: 82
End: 2017-11-15

## 2017-11-15 NOTE — TELEPHONE ENCOUNTER
Jesusita, RN/[facility] called and is asking if it is ok for 1) to have a NP come out to evaluate pt for palliative care and 2) ok for comfort care-this is when a  comes out to the home to discuss further care options.     She said this was discussed

## 2017-11-29 ENCOUNTER — TELEPHONE (OUTPATIENT)
Dept: FAMILY MEDICINE CLINIC | Facility: CLINIC | Age: 82
End: 2017-11-29

## 2017-11-29 DIAGNOSIS — R19.7 DIARRHEA, UNSPECIFIED TYPE: Primary | ICD-10-CM

## 2017-11-29 NOTE — TELEPHONE ENCOUNTER
Pt has had watery stool for 3 days, increased fatique -exhaused. Ismael said she would liek to get labs drawn for her to check her electrolytes and also for cdiff.       Please call her at [phone]

## 2017-11-30 ENCOUNTER — LAB REQUISITION (OUTPATIENT)
Dept: LAB | Facility: HOSPITAL | Age: 82
End: 2017-11-30
Attending: INTERNAL MEDICINE
Payer: MEDICARE

## 2017-11-30 DIAGNOSIS — R69 ILLNESS: ICD-10-CM

## 2017-11-30 PROCEDURE — 83883 ASSAY NEPHELOMETRY NOT SPEC: CPT | Performed by: INTERNAL MEDICINE

## 2017-11-30 PROCEDURE — 86334 IMMUNOFIX E-PHORESIS SERUM: CPT | Performed by: INTERNAL MEDICINE

## 2017-11-30 PROCEDURE — 84165 PROTEIN E-PHORESIS SERUM: CPT | Performed by: INTERNAL MEDICINE

## 2017-11-30 PROCEDURE — 80053 COMPREHEN METABOLIC PANEL: CPT | Performed by: INTERNAL MEDICINE

## 2017-11-30 PROCEDURE — 85025 COMPLETE CBC W/AUTO DIFF WBC: CPT | Performed by: INTERNAL MEDICINE

## 2017-11-30 NOTE — TELEPHONE ENCOUNTER
Okay for order for BMP. C. difficile can only be done on watery stool. Okay to order C. difficile, however please instruct patient/family/home health nurse that it needs to be a watery stool that is submitted using appropriate stool collection container.

## 2017-11-30 NOTE — TELEPHONE ENCOUNTER
Spoke to Ju Acosta with orders. Orders for C Diff placed in system. Also, discussed labs due that were ordered by Dr. Amanda Fishman that she will draw as well.   She was not sure if she could draw for the monoclonal study but she states she will check with the

## 2017-12-01 ENCOUNTER — LAB ENCOUNTER (OUTPATIENT)
Dept: LAB | Facility: HOSPITAL | Age: 82
End: 2017-12-01
Attending: FAMILY MEDICINE
Payer: MEDICARE

## 2017-12-01 ENCOUNTER — TELEPHONE (OUTPATIENT)
Dept: FAMILY MEDICINE CLINIC | Facility: CLINIC | Age: 82
End: 2017-12-01

## 2017-12-01 DIAGNOSIS — R19.7 DIARRHEA: Primary | ICD-10-CM

## 2017-12-01 DIAGNOSIS — R19.7 DIARRHEA, UNSPECIFIED TYPE: ICD-10-CM

## 2017-12-01 NOTE — TELEPHONE ENCOUNTER
JesusitaRN called in with c/o patient feeling weak, pale, fatigued recently. Cloudy thinking. Pt has been having diarrhea as well and for 3 of those days the stool was black/tarry. Denies fever. After discussing with dr. Nico Ortiz pt is to go to the ER.

## 2017-12-02 PROCEDURE — 87493 C DIFF AMPLIFIED PROBE: CPT

## 2017-12-02 PROCEDURE — 82272 OCCULT BLD FECES 1-3 TESTS: CPT

## 2017-12-07 ENCOUNTER — APPOINTMENT (OUTPATIENT)
Dept: HEMATOLOGY/ONCOLOGY | Age: 82
End: 2017-12-07
Attending: INTERNAL MEDICINE
Payer: MEDICARE

## 2017-12-21 ENCOUNTER — LAB ENCOUNTER (OUTPATIENT)
Dept: LAB | Age: 82
End: 2017-12-21
Attending: FAMILY MEDICINE
Payer: MEDICARE

## 2017-12-21 ENCOUNTER — OFFICE VISIT (OUTPATIENT)
Dept: FAMILY MEDICINE CLINIC | Facility: CLINIC | Age: 82
End: 2017-12-21

## 2017-12-21 VITALS
WEIGHT: 103.38 LBS | RESPIRATION RATE: 16 BRPM | DIASTOLIC BLOOD PRESSURE: 68 MMHG | HEART RATE: 72 BPM | SYSTOLIC BLOOD PRESSURE: 112 MMHG | BODY MASS INDEX: 19.77 KG/M2 | HEIGHT: 60.63 IN

## 2017-12-21 DIAGNOSIS — I35.0 AORTIC VALVE STENOSIS, ETIOLOGY OF CARDIAC VALVE DISEASE UNSPECIFIED: ICD-10-CM

## 2017-12-21 DIAGNOSIS — R91.1 LUNG NODULE < 6CM ON CT: ICD-10-CM

## 2017-12-21 DIAGNOSIS — I77.9 BILATERAL CAROTID ARTERY DISEASE (HCC): ICD-10-CM

## 2017-12-21 DIAGNOSIS — E03.9 ACQUIRED HYPOTHYROIDISM: ICD-10-CM

## 2017-12-21 DIAGNOSIS — R63.6 UNDERWEIGHT: ICD-10-CM

## 2017-12-21 DIAGNOSIS — D64.9 ANEMIA, UNSPECIFIED TYPE: ICD-10-CM

## 2017-12-21 DIAGNOSIS — N18.30 CKD (CHRONIC KIDNEY DISEASE) STAGE 3, GFR 30-59 ML/MIN (HCC): ICD-10-CM

## 2017-12-21 DIAGNOSIS — I05.9 MITRAL VALVE ANNULAR CALCIFICATION: ICD-10-CM

## 2017-12-21 DIAGNOSIS — E03.9 ACQUIRED HYPOTHYROIDISM: Primary | ICD-10-CM

## 2017-12-21 DIAGNOSIS — I35.1 AORTIC VALVE INSUFFICIENCY, ETIOLOGY OF CARDIAC VALVE DISEASE UNSPECIFIED: ICD-10-CM

## 2017-12-21 PROBLEM — I34.81 MITRAL VALVE ANNULAR CALCIFICATION: Status: ACTIVE | Noted: 2017-12-21

## 2017-12-21 PROBLEM — I05.0 MITRAL VALVE STENOSIS: Status: ACTIVE | Noted: 2017-12-21

## 2017-12-21 PROBLEM — I35.9 AORTIC VALVE CALCIFICATION: Status: ACTIVE | Noted: 2017-12-21

## 2017-12-21 PROBLEM — I10 ESSENTIAL HYPERTENSION: Status: RESOLVED | Noted: 2017-01-07 | Resolved: 2017-12-21

## 2017-12-21 PROCEDURE — 99214 OFFICE O/P EST MOD 30 MIN: CPT | Performed by: FAMILY MEDICINE

## 2017-12-21 PROCEDURE — 83550 IRON BINDING TEST: CPT

## 2017-12-21 PROCEDURE — 83883 ASSAY NEPHELOMETRY NOT SPEC: CPT

## 2017-12-21 PROCEDURE — 84439 ASSAY OF FREE THYROXINE: CPT

## 2017-12-21 PROCEDURE — 84165 PROTEIN E-PHORESIS SERUM: CPT

## 2017-12-21 PROCEDURE — 82728 ASSAY OF FERRITIN: CPT

## 2017-12-21 PROCEDURE — 83540 ASSAY OF IRON: CPT

## 2017-12-21 PROCEDURE — 86334 IMMUNOFIX E-PHORESIS SERUM: CPT

## 2017-12-21 PROCEDURE — 84443 ASSAY THYROID STIM HORMONE: CPT

## 2017-12-21 PROCEDURE — 80053 COMPREHEN METABOLIC PANEL: CPT

## 2017-12-21 PROCEDURE — 82746 ASSAY OF FOLIC ACID SERUM: CPT

## 2017-12-21 PROCEDURE — 36415 COLL VENOUS BLD VENIPUNCTURE: CPT

## 2017-12-21 PROCEDURE — 85025 COMPLETE CBC W/AUTO DIFF WBC: CPT

## 2017-12-21 PROCEDURE — 82607 VITAMIN B-12: CPT

## 2017-12-21 RX ORDER — OLANZAPINE 5 MG/1
7.5 TABLET ORAL DAILY
Refills: 1 | COMMUNITY
Start: 2017-12-18

## 2017-12-21 RX ORDER — LEVOTHYROXINE SODIUM 0.05 MG/1
50 TABLET ORAL
Qty: 90 TABLET | Refills: 1 | Status: SHIPPED | OUTPATIENT
Start: 2017-12-21 | End: 2018-05-03 | Stop reason: DRUGHIGH

## 2017-12-21 NOTE — PATIENT INSTRUCTIONS
Discharge Instructions for Aortic Valve Stenosis  You have been diagnosed with aortic valve stenosis. This means the aortic valve in your heart is stiff or remains in a near-closed position and has trouble opening.  Because of this, your heart has to work © 1658-2306 [address] [facility], [facility] [place]. All rights reserved. This information is not intended as a substitute for professional medical care. Always follow your healthcare professional's instructions.

## 2017-12-22 ENCOUNTER — HOSPITAL ENCOUNTER (OUTPATIENT)
Dept: CT IMAGING | Age: 82
Discharge: HOME | End: 2017-12-22
Attending: INTERNAL MEDICINE
Payer: MEDICARE

## 2017-12-22 DIAGNOSIS — N18.30 CKD (CHRONIC KIDNEY DISEASE) STAGE 3, GFR 30-59 ML/MIN (HCC): ICD-10-CM

## 2017-12-22 DIAGNOSIS — R91.1 LUNG NODULE < 6CM ON CT: ICD-10-CM

## 2017-12-22 DIAGNOSIS — D64.9 ANEMIA, UNSPECIFIED TYPE: ICD-10-CM

## 2017-12-22 PROCEDURE — 71250 CT THORAX DX C-: CPT | Performed by: INTERNAL MEDICINE

## 2017-12-26 ENCOUNTER — TELEPHONE (OUTPATIENT)
Dept: FAMILY MEDICINE CLINIC | Facility: CLINIC | Age: 82
End: 2017-12-26

## 2017-12-27 ENCOUNTER — TELEPHONE (OUTPATIENT)
Dept: FAMILY MEDICINE CLINIC | Facility: CLINIC | Age: 82
End: 2017-12-27

## 2017-12-27 DIAGNOSIS — R79.89 ELEVATED TSH: Primary | ICD-10-CM

## 2017-12-27 NOTE — TELEPHONE ENCOUNTER
At office visit the patient's daughter, Krishna Robison, informed me that the patient has not missed any doses on her levothyroxine.  She also informed me that the patient was taking an iron supplement, and she was unsure if the supplement would interfere with the l

## 2017-12-27 NOTE — TELEPHONE ENCOUNTER
Jennifer Schmitt states that she is on her way to the office for an appointment and will let us know whether the patient is missing any doses of her levothyroxine or not. She states that she will ask the patient's caregiver.

## 2017-12-27 NOTE — TELEPHONE ENCOUNTER
----- Message from Shanna Ramirez DO sent at 12/26/2017  7:32 PM CST -----  Please call patient's daughter Shania Gonzalez has gone up over normal.  Has patient been missing any doses of her levothyroxine?

## 2017-12-28 ENCOUNTER — APPOINTMENT (OUTPATIENT)
Dept: HEMATOLOGY/ONCOLOGY | Age: 82
End: 2017-12-28
Attending: INTERNAL MEDICINE
Payer: MEDICARE

## 2017-12-28 PROBLEM — R63.6 UNDERWEIGHT: Status: ACTIVE | Noted: 2017-12-28

## 2017-12-28 NOTE — PROGRESS NOTES
Emily Hawley is a 80year old female. HPI:     Patient is accompanied by her daughter Joel Camacho and her  Abbey Arana, they are both pleasant and supportive.     Hypothyroidism:  Patient has a caregiver and her daughter Joel Camacho needs to check with the care Patient Active Problem List:     Bicuspid aortic valve     Acquired hypothyroidism     IBD (inflammatory bowel disease)     Microscopic colitis     Gastroesophageal reflux disease     Denervation atrophy of muscle     Azotemia     Anemia     B12 deficien appetite    • Major depressive disorder, recurrent episode, moderate with anxious distress ([facility] [address] 7/16/2017   • Microscopic colitis 1/7/2017   • Migraines    • Mitral valve annular calcification 12/21/2017   • Mitral valve stenosis 12/21/2017   • Moderate a Wt 103 lb 6.4 oz   BMI 19.78 kg/m²  Estimated body mass index is 19.78 kg/m² as calculated from the following:    Height as of this encounter: 60.63\". Weight as of this encounter: 103 lb 6.4 oz. Physical Exam   Nursing note and vitals reviewed.    Co insufficiency, etiology of cardiac valve disease unspecified  Follow-up with cardiologist as instructed. 5. Mitral valve annular calcification  Follow-up with cardiologist as instructed. 6. Underweight  Continue Creon. Healthy diet encouraged.   Briseyda

## 2018-01-04 PROBLEM — F02.80 LATE ONSET ALZHEIMER'S DISEASE WITHOUT BEHAVIORAL DISTURBANCE (HCC): Status: ACTIVE | Noted: 2018-01-04

## 2018-01-04 PROBLEM — G30.1 LATE ONSET ALZHEIMER'S DISEASE WITHOUT BEHAVIORAL DISTURBANCE (HCC): Status: ACTIVE | Noted: 2018-01-04

## 2018-01-04 NOTE — PROGRESS NOTES
[facility] Neurology outpatient progress note  Date of service: 1/4/2018    Patient here for evaluation of confusion, memory loss, she was seeing Dr Christ Ramsay before for neuromuscular conditions, she was reported by family having more staring spells, as well as co Allergies:    Ibuprofen               Unknown    Comment:possible muscle weakness  Tramadol                Other (See Comments)    Comment:seizure  Past Medical History:   Diagnosis Date   • Acquired hypothyroidism 1/7/2017   • Anorexia    • Aortic valve Visual impairment     reading glasses   • Weakness of both lower extremities    • Weight loss      Past Surgical History:  No date: APPENDECTOMY  3/7/2017: COLONOSCOPY N/A      Comment: Procedure: COLONOSCOPY;  Surgeon: Shanna Erazo MD and agrees with the plan. Discussed with patient in detail regarding the adverse and side effects of the medications. RTC 3-4 months, will call with EEG result.  May start aricept or exelon patch    I spent a total of 40 minutes with patient, 25 minutes w

## 2018-01-04 NOTE — PROGRESS NOTES
Patient here for second opinion visit on neuropathy. Also wanting to discuss new onset of confusion/memory loss over last 5 months.

## 2018-01-04 NOTE — PATIENT INSTRUCTIONS
Refill policies:    • Allow 2-3 business days for refills; controlled substances may take longer.   • Contact your pharmacy at least 5 days prior to running out of medication and have them send an electronic request or submit request through the [facility] have a procedure or additional testing performed. [facility]) will contact your insurance carrier to obtain pre-certification or prior authorization.     Unfortunately, [employer] has seen an increase in denial of payment even though the p

## 2018-01-10 RX ORDER — POTASSIUM CHLORIDE 20 MEQ/1
TABLET, EXTENDED RELEASE ORAL
Qty: 90 TABLET | Refills: 0 | Status: SHIPPED | OUTPATIENT
Start: 2018-01-10 | End: 2018-04-23

## 2018-01-18 ENCOUNTER — OFFICE VISIT (OUTPATIENT)
Dept: HEMATOLOGY/ONCOLOGY | Age: 83
End: 2018-01-18
Attending: INTERNAL MEDICINE
Payer: MEDICARE

## 2018-01-18 VITALS
HEART RATE: 78 BPM | SYSTOLIC BLOOD PRESSURE: 148 MMHG | OXYGEN SATURATION: 100 % | DIASTOLIC BLOOD PRESSURE: 81 MMHG | RESPIRATION RATE: 18 BRPM | BODY MASS INDEX: 20 KG/M2 | TEMPERATURE: 99 F | WEIGHT: 105.81 LBS

## 2018-01-18 DIAGNOSIS — S06.0X1D CONCUSSION WITH LOSS OF CONSCIOUSNESS OF 30 MINUTES OR LESS, SUBSEQUENT ENCOUNTER: ICD-10-CM

## 2018-01-18 DIAGNOSIS — E83.52 HYPERCALCEMIA: Primary | ICD-10-CM

## 2018-01-18 DIAGNOSIS — R91.1 PULMONARY NODULE: ICD-10-CM

## 2018-01-18 DIAGNOSIS — D63.8 ANEMIA DUE TO CHRONIC ILLNESS: ICD-10-CM

## 2018-01-18 LAB
ALBUMIN SERPL-MCNC: 3.2 G/DL (ref 3.5–4.8)
ALP LIVER SERPL-CCNC: 71 U/L (ref 55–142)
ALT SERPL-CCNC: 16 U/L (ref 14–54)
AST SERPL-CCNC: 18 U/L (ref 15–41)
BASOPHILS # BLD AUTO: 0.03 X10(3) UL (ref 0–0.1)
BASOPHILS NFR BLD AUTO: 0.5 %
BILIRUB SERPL-MCNC: 0.4 MG/DL (ref 0.1–2)
BUN BLD-MCNC: 25 MG/DL (ref 8–20)
CALCIUM BLD-MCNC: 9.7 MG/DL (ref 8.3–10.3)
CHLORIDE: 106 MMOL/L (ref 101–111)
CO2: 26 MMOL/L (ref 22–32)
CREAT BLD-MCNC: 1 MG/DL (ref 0.55–1.02)
EOSINOPHIL # BLD AUTO: 0.16 X10(3) UL (ref 0–0.3)
EOSINOPHIL NFR BLD AUTO: 2.7 %
ERYTHROCYTE [DISTWIDTH] IN BLOOD BY AUTOMATED COUNT: 13.2 % (ref 11.5–16)
GLUCOSE BLD-MCNC: 97 MG/DL (ref 70–99)
HCT VFR BLD AUTO: 30.5 % (ref 34–50)
HGB BLD-MCNC: 10.2 G/DL (ref 12–16)
IMMATURE GRANULOCYTE COUNT: 0.03 X10(3) UL (ref 0–1)
IMMATURE GRANULOCYTE RATIO %: 0.5 %
LYMPHOCYTES # BLD AUTO: 1.1 X10(3) UL (ref 0.9–4)
LYMPHOCYTES NFR BLD AUTO: 18.7 %
M PROTEIN MFR SERPL ELPH: 7.8 G/DL (ref 6.1–8.3)
MCH RBC QN AUTO: 34 PG (ref 27–33.2)
MCHC RBC AUTO-ENTMCNC: 33.4 G/DL (ref 31–37)
MCV RBC AUTO: 101.7 FL (ref 81–100)
MONOCYTES # BLD AUTO: 0.76 X10(3) UL (ref 0.1–0.6)
MONOCYTES NFR BLD AUTO: 12.9 %
NEUTROPHIL ABS PRELIM: 3.8 X10 (3) UL (ref 1.3–6.7)
NEUTROPHILS # BLD AUTO: 3.8 X10(3) UL (ref 1.3–6.7)
NEUTROPHILS NFR BLD AUTO: 64.7 %
PLATELET # BLD AUTO: 187 10(3)UL (ref 150–450)
POTASSIUM SERPL-SCNC: 4.2 MMOL/L (ref 3.6–5.1)
RBC # BLD AUTO: 3 X10(6)UL (ref 3.8–5.1)
RED CELL DISTRIBUTION WIDTH-SD: 49.2 FL (ref 35.1–46.3)
SODIUM SERPL-SCNC: 138 MMOL/L (ref 136–144)
WBC # BLD AUTO: 5.9 X10(3) UL (ref 4–13)

## 2018-01-18 PROCEDURE — 99214 OFFICE O/P EST MOD 30 MIN: CPT | Performed by: INTERNAL MEDICINE

## 2018-01-18 NOTE — PATIENT INSTRUCTIONS
For Dr. Redmond Oar nurse line, call  [phone] with any questions or concerns Monday through Friday 8:00 to 4:30.   After hours or weekends for emergent needs [phone]

## 2018-01-18 NOTE — PROGRESS NOTES
Pt here with family for MD f/u visit.    Education Record    Learner:  Patient    Barriers / Limitations:  None   Comments:    Method:  Brief focused and Printed material   Comments:    General Topics:  Medication, Side effects and symptom management and Pl

## 2018-01-18 NOTE — PROGRESS NOTES
Cancer Center Progress Note  Patient Name: Jose Georges   YOB: 1931   Medical Record Number: [id number]   CSN: [id number]   Attending Physician: Tamy Santiago M.D.        Date of Visit: 1/18/2018    Chief Complaint:  Patient present started marinol, with some stabilization of her weight. History of Present Illness:  Pt is here for follow up. Her wt has stabilized. Overall, she feels about the same. No pain. Decreased appetite persists. No N/V.       Performance Status:  ECOG 2 episode 3 years ago   • Seizure disorder [facility])    • Sleep disturbance    • Underweight 12/28/2017   • Visual impairment     glasses   • Visual impairment     reading glasses   • Weakness of both lower extremities    • Weight loss        Past Surgical Histo B-12 OR), Take 1 capsule by mouth daily. , Disp: , Rfl:   •  Cholecalciferol (VITAMIN D) 1000 UNITS Oral Tab, Take 1 capsule by mouth daily. , Disp: , Rfl:   •  multivitamin Oral Tab, Take 1 tablet by mouth daily. , Disp: , Rfl:   •  Loperamide HCl (IMODIUM A Regular rate and rhythm, no murmurs. Abdomen Normal - Non-tender, non-distended, no masses, ascites or hepatosplenomegaly. Extremities Normal - No cyanosis, clubbing or edema.     Integumentary Normal - No rashes and noJaundice   Neurologic Normal - No KAPPA FREE LIGHT CHAIN Latest Ref Range: 0.330 - 1.940 mg/dL 11.330 (H)   LAMBDA FREE LIGHT CHAIN Latest Ref Range: 0.571 - 2.630 mg/dL 5.322 (H)   KAPPA/LAMBDA FLC RATIO Latest Ref Range: 0.26 - 1.65  2.13 (H)   PROTEIN ELECT INTERPRETATION Unknown Poly the patient and the family. Electronically Signed by: Jostin Fay M.D.   [facility]

## 2018-01-19 LAB
25-HYDROXYVITAMIN D (TOTAL): 34 NG/ML (ref 30–100)
PTH-INTACT SERPL-MCNC: 6.8 PG/ML (ref 11.1–79.5)

## 2018-01-21 LAB — 1,25-DIHYDROXYVITAMIN D: 35.8 PG/ML

## 2018-02-02 ENCOUNTER — NURSE ONLY (OUTPATIENT)
Dept: NEUROLOGY | Facility: CLINIC | Age: 83
End: 2018-02-02

## 2018-02-02 DIAGNOSIS — F02.80 LATE ONSET ALZHEIMER'S DISEASE WITHOUT BEHAVIORAL DISTURBANCE (HCC): ICD-10-CM

## 2018-02-02 DIAGNOSIS — G30.1 LATE ONSET ALZHEIMER'S DISEASE WITHOUT BEHAVIORAL DISTURBANCE (HCC): ICD-10-CM

## 2018-02-02 PROCEDURE — 95816 EEG AWAKE AND DROWSY: CPT | Performed by: OTHER

## 2018-02-02 NOTE — PROCEDURES
Date of Procedure: 2/2/2018    Procedure: EEG (ELECTROENCEPHALOGRAM)     DX: LATE ONSET ALZHEIMERS DISEASE, MALNUTRITION  HX: PT IS A 87 YO FEMALE WHO PRESENTS WITH CONFUSION AND MEMORY LOSS. PT IS A POOR HISTORIAN, HX TAKEN FROM CHARTS.  PT IS REPORTED TO

## 2018-02-06 ENCOUNTER — TELEPHONE (OUTPATIENT)
Dept: FAMILY MEDICINE CLINIC | Facility: CLINIC | Age: 83
End: 2018-02-06

## 2018-02-06 DIAGNOSIS — R30.0 DYSURIA: Primary | ICD-10-CM

## 2018-02-06 RX ORDER — CIPROFLOXACIN 250 MG/1
250 TABLET, FILM COATED ORAL 2 TIMES DAILY
Qty: 10 TABLET | Refills: 0 | Status: SHIPPED | OUTPATIENT
Start: 2018-02-06 | End: 2018-02-14 | Stop reason: ALTCHOICE

## 2018-02-06 NOTE — TELEPHONE ENCOUNTER
Spoke to Cedric and she said that pt is no longer with home health. They were referred to palliative care but they have not gotten the paperwork back from our office yet.    Per Cedric pt just has the frequency mostly at night and has been going on for awh

## 2018-02-06 NOTE — TELEPHONE ENCOUNTER
Agree with plan. Patient's daughter to call let us know if she does not improve after being on the antibiotic for a few days. Ideally, please do urine culture if possible.

## 2018-02-06 NOTE — TELEPHONE ENCOUNTER
Pt's daughter called and said Josr Nicole has been waking up 5-6 times a night going to the bathroom.   She really doesn't want to bring her in just so Dr. Charlotte Sun might tell her to go to a urologist because she is to hard to move around and it's cold outside

## 2018-02-06 NOTE — TELEPHONE ENCOUNTER
After speaking to Dr. Manuela Mcmillan pt to start Cipro 250mg BID for 5 days and to get 2 doses in today.   Med sent to pharmacy and pt's daughter notified

## 2018-02-07 ENCOUNTER — APPOINTMENT (OUTPATIENT)
Dept: HEMATOLOGY/ONCOLOGY | Age: 83
End: 2018-02-07
Attending: INTERNAL MEDICINE
Payer: MEDICARE

## 2018-02-14 ENCOUNTER — OFFICE VISIT (OUTPATIENT)
Dept: HEMATOLOGY/ONCOLOGY | Age: 83
End: 2018-02-14
Attending: INTERNAL MEDICINE
Payer: MEDICARE

## 2018-02-14 VITALS
DIASTOLIC BLOOD PRESSURE: 76 MMHG | SYSTOLIC BLOOD PRESSURE: 132 MMHG | TEMPERATURE: 99 F | WEIGHT: 105 LBS | BODY MASS INDEX: 20 KG/M2 | HEART RATE: 109 BPM | RESPIRATION RATE: 18 BRPM | OXYGEN SATURATION: 98 %

## 2018-02-14 DIAGNOSIS — D64.9 ANEMIA, UNSPECIFIED TYPE: ICD-10-CM

## 2018-02-14 DIAGNOSIS — D63.8 ANEMIA DUE TO CHRONIC ILLNESS: Primary | ICD-10-CM

## 2018-02-14 DIAGNOSIS — K52.9 CHRONIC COLITIS: ICD-10-CM

## 2018-02-14 DIAGNOSIS — E83.52 HYPERCALCEMIA: ICD-10-CM

## 2018-02-14 DIAGNOSIS — R91.1 PULMONARY NODULE: ICD-10-CM

## 2018-02-14 DIAGNOSIS — R63.4 WEIGHT LOSS: ICD-10-CM

## 2018-02-14 LAB
ALBUMIN SERPL-MCNC: 3.3 G/DL (ref 3.5–4.8)
ALP LIVER SERPL-CCNC: 66 U/L (ref 55–142)
ALT SERPL-CCNC: 16 U/L (ref 14–54)
AST SERPL-CCNC: 22 U/L (ref 15–41)
BASOPHILS # BLD AUTO: 0.02 X10(3) UL (ref 0–0.1)
BASOPHILS NFR BLD AUTO: 0.4 %
BILIRUB SERPL-MCNC: 0.4 MG/DL (ref 0.1–2)
BUN BLD-MCNC: 31 MG/DL (ref 8–20)
CALCIUM BLD-MCNC: 10.1 MG/DL (ref 8.3–10.3)
CHLORIDE: 107 MMOL/L (ref 101–111)
CO2: 28 MMOL/L (ref 22–32)
CREAT BLD-MCNC: 1.13 MG/DL (ref 0.55–1.02)
EOSINOPHIL # BLD AUTO: 0.14 X10(3) UL (ref 0–0.3)
EOSINOPHIL NFR BLD AUTO: 2.5 %
ERYTHROCYTE [DISTWIDTH] IN BLOOD BY AUTOMATED COUNT: 12.5 % (ref 11.5–16)
GLUCOSE BLD-MCNC: 105 MG/DL (ref 70–99)
HCT VFR BLD AUTO: 32.8 % (ref 34–50)
HGB BLD-MCNC: 10.8 G/DL (ref 12–16)
IMMATURE GRANULOCYTE COUNT: 0.03 X10(3) UL (ref 0–1)
IMMATURE GRANULOCYTE RATIO %: 0.5 %
LYMPHOCYTES # BLD AUTO: 0.87 X10(3) UL (ref 0.9–4)
LYMPHOCYTES NFR BLD AUTO: 15.6 %
M PROTEIN MFR SERPL ELPH: 7.9 G/DL (ref 6.1–8.3)
MCH RBC QN AUTO: 33.8 PG (ref 27–33.2)
MCHC RBC AUTO-ENTMCNC: 32.9 G/DL (ref 31–37)
MCV RBC AUTO: 102.5 FL (ref 81–100)
MONOCYTES # BLD AUTO: 0.69 X10(3) UL (ref 0.1–1)
MONOCYTES NFR BLD AUTO: 12.4 %
NEUTROPHIL ABS PRELIM: 3.82 X10 (3) UL (ref 1.3–6.7)
NEUTROPHILS # BLD AUTO: 3.82 X10(3) UL (ref 1.3–6.7)
NEUTROPHILS NFR BLD AUTO: 68.6 %
PLATELET # BLD AUTO: 193 10(3)UL (ref 150–450)
POTASSIUM SERPL-SCNC: 4 MMOL/L (ref 3.6–5.1)
RBC # BLD AUTO: 3.2 X10(6)UL (ref 3.8–5.1)
RED CELL DISTRIBUTION WIDTH-SD: 47.6 FL (ref 35.1–46.3)
SODIUM SERPL-SCNC: 140 MMOL/L (ref 136–144)
WBC # BLD AUTO: 5.6 X10(3) UL (ref 4–13)

## 2018-02-14 PROCEDURE — 99214 OFFICE O/P EST MOD 30 MIN: CPT | Performed by: INTERNAL MEDICINE

## 2018-02-14 NOTE — PROGRESS NOTES
Cancer Center Progress Note  Patient Name: Ysabel Brooks   YOB: 1931   Medical Record Number: [id number]   CSN: [id number]   Attending Physician: Nancy Irizarry M.D.        Date of Visit: 2/14/2018      Chief Complaint:  Patient prese started marinol, with some stabilization of her weight. History of Present Illness:  Pt is here for follow up. Her wt has stabilized. Energy level is stable. No abd pain or confusion. Eating and drinking well.     Performance Status:  ECOG 2    Past Med ago   • Seizure disorder [facility])    • Sleep disturbance    • Underweight 12/28/2017   • Visual impairment     glasses   • Visual impairment     reading glasses   • Weakness of both lower extremities    • Weight loss        Past Surgical History:  Past Surgic capsule by mouth daily. , Disp: , Rfl:   •  Cholecalciferol (VITAMIN D) 1000 UNITS Oral Tab, Take 1 capsule by mouth daily. , Disp: , Rfl:   •  multivitamin Oral Tab, Take 1 tablet by mouth daily. , Disp: , Rfl:   •  Loperamide HCl (IMODIUM A-D) 2 MG Oral Tab rhonchi or wheezing. Cardiovascular Normal - Regular rate and rhythm, no murmurs, gallops or rubs. Abdomen Normal - Non-tender, non-distended, no masses, ascites or hepatosplenomegaly.     Extremities Normal - No visible deformities, no cyanosis, clubbi Group

## 2018-02-15 ENCOUNTER — LAB ENCOUNTER (OUTPATIENT)
Dept: LAB | Age: 83
End: 2018-02-15
Attending: FAMILY MEDICINE
Payer: MEDICARE

## 2018-02-15 DIAGNOSIS — R30.0 DYSURIA: ICD-10-CM

## 2018-02-16 ENCOUNTER — APPOINTMENT (OUTPATIENT)
Dept: LAB | Age: 83
End: 2018-02-16
Attending: FAMILY MEDICINE
Payer: MEDICARE

## 2018-02-16 DIAGNOSIS — R30.0 DYSURIA: ICD-10-CM

## 2018-02-16 LAB
BILIRUB UR QL STRIP.AUTO: NEGATIVE
CLARITY UR REFRACT.AUTO: CLEAR
COLOR UR AUTO: YELLOW
GLUCOSE UR STRIP.AUTO-MCNC: NEGATIVE MG/DL
KETONES UR STRIP.AUTO-MCNC: NEGATIVE MG/DL
LEUKOCYTE ESTERASE UR QL STRIP.AUTO: NEGATIVE
NITRITE UR QL STRIP.AUTO: NEGATIVE
PH UR STRIP.AUTO: 5.5 [PH] (ref 4.5–8)
PROT UR STRIP.AUTO-MCNC: NEGATIVE MG/DL
RBC UR QL AUTO: NEGATIVE
SP GR UR STRIP.AUTO: 1.01 (ref 1–1.03)
UROBILINOGEN UR STRIP.AUTO-MCNC: 0.2 MG/DL

## 2018-02-16 PROCEDURE — 81003 URINALYSIS AUTO W/O SCOPE: CPT

## 2018-03-05 ENCOUNTER — TELEPHONE (OUTPATIENT)
Dept: FAMILY MEDICINE CLINIC | Facility: CLINIC | Age: 83
End: 2018-03-05

## 2018-03-19 ENCOUNTER — TELEPHONE (OUTPATIENT)
Dept: HEMATOLOGY/ONCOLOGY | Facility: HOSPITAL | Age: 83
End: 2018-03-19

## 2018-03-19 NOTE — TELEPHONE ENCOUNTER
Mariana, RN with [facility] is requesting a dietician consult for pt to help with loss of appetite and difficulty finding food to prepare that tastes good.      Okay per Dr. Twyla Mitchell for pt to meet with dietician, but would prefer pt to see Vianey Salas [address] [place] [address]

## 2018-03-29 ENCOUNTER — TELEPHONE (OUTPATIENT)
Dept: HEMATOLOGY/ONCOLOGY | Facility: HOSPITAL | Age: 83
End: 2018-03-29

## 2018-03-29 NOTE — TELEPHONE ENCOUNTER
Pt's dtr is out of town. Can patient see [facility] dietician? Per Dr. Samia Almonte -- yes.

## 2018-04-05 NOTE — PATIENT INSTRUCTIONS
Refill policies:    • Allow 2-3 business days for refills; controlled substances may take longer.   • Contact your pharmacy at least 5 days prior to running out of medication and have them send an electronic request or submit request through the [facility] for the entire amount billed. Precertification and Prior Authorizations  If your physician has recommended that you have a procedure or additional testing performed.   [facility] ([employer]) will contact your insurance carrier to obtain pr

## 2018-04-05 NOTE — PROGRESS NOTES
Patient here to follow up regarding confusion/memory loss. Family states she has been more confused since last visit.

## 2018-04-05 NOTE — PROGRESS NOTES
[facility] Neurology outpatient progress note  Date of service: 4/5/2018    Patient here to follow up regarding confusion/memory loss. Family states she has been more confused since last visit. No convulsive seizure reported.   she was seeing Dr Ming Diehl before fo 1000 UNITS Oral Tab, Take 1 capsule by mouth daily. , Disp: , Rfl:   •  multivitamin Oral Tab, Take 1 tablet by mouth daily. , Disp: , Rfl:   •  Loperamide HCl (IMODIUM A-D) 2 MG Oral Tab, Take 2 mg by mouth 2 (two) times daily as needed for Diarrhea., Disp: Healthcare   • Osteoarthritis     general   • Osteoarthritis     generalized   • Problems with swallowing    • Protein malnutrition ([facility] 2/28/2017   • Seizure disorder (HCC)     last episode 3 years ago   • Seizure disorder ([facility]    • Sleep disturbance without behavioral disturbance  (primary encounter diagnosis): advanced  Muscle atrophy: etiology unclear, multifactorial likely, I suspect Malnutrition played a role  Plan:   EEG reviewed with pt/family  Aricept 5 mg qhs then 10 mg qhs if tolerating well

## 2018-04-11 ENCOUNTER — TELEPHONE (OUTPATIENT)
Dept: HEMATOLOGY/ONCOLOGY | Facility: HOSPITAL | Age: 83
End: 2018-04-11

## 2018-04-11 NOTE — TELEPHONE ENCOUNTER
Mariana from [facility] -- would like 4 more visits and possible recertify at the end of the month. OK per Dr. Raulito Alfonso.

## 2018-04-13 ENCOUNTER — TELEPHONE (OUTPATIENT)
Dept: HEMATOLOGY/ONCOLOGY | Facility: HOSPITAL | Age: 83
End: 2018-04-13

## 2018-04-16 ENCOUNTER — TELEPHONE (OUTPATIENT)
Dept: FAMILY MEDICINE CLINIC | Facility: CLINIC | Age: 83
End: 2018-04-16

## 2018-04-16 NOTE — TELEPHONE ENCOUNTER
Pretty Davila is calling to see what she needs to do regarding a DNR for her mother, please call her at [phone]

## 2018-04-16 NOTE — TELEPHONE ENCOUNTER
Spoke to Cedric and asked her to reach out to the home health facility who works with her Mom [facility]) and talk to the RN about the DNR forms and the process. If she still had any problems after talking to them to please call our office.   She verbalized und

## 2018-04-17 ENCOUNTER — LAB ENCOUNTER (OUTPATIENT)
Dept: LAB | Age: 83
End: 2018-04-17
Attending: FAMILY MEDICINE
Payer: MEDICARE

## 2018-04-17 DIAGNOSIS — R79.89 ELEVATED TSH: ICD-10-CM

## 2018-04-17 PROCEDURE — 84439 ASSAY OF FREE THYROXINE: CPT

## 2018-04-17 PROCEDURE — 36415 COLL VENOUS BLD VENIPUNCTURE: CPT

## 2018-04-17 PROCEDURE — 84443 ASSAY THYROID STIM HORMONE: CPT

## 2018-04-19 ENCOUNTER — TELEPHONE (OUTPATIENT)
Dept: FAMILY MEDICINE CLINIC | Facility: CLINIC | Age: 83
End: 2018-04-19

## 2018-04-19 NOTE — TELEPHONE ENCOUNTER
Pt's daughter informed of test results and recommendations, appt scheduled.   Future Appointments  Date Time Provider Odell Sidhu   5/3/2018 11:30 AM Radha Martin, DO [facility] [place]     At daughters request DNR forms at the  ready f

## 2018-04-19 NOTE — TELEPHONE ENCOUNTER
----- Message from Madhuri Lynn DO sent at 4/18/2018  8:47 PM CDT -----  Please call patient or patient's daughter and have her make an appointment to see me for follow-up on hypothyroidism. Hypothyroidism needs improvement.

## 2018-04-23 RX ORDER — POTASSIUM CHLORIDE 20 MEQ/1
TABLET, EXTENDED RELEASE ORAL
Qty: 90 TABLET | Refills: 0 | Status: SHIPPED | OUTPATIENT
Start: 2018-04-23 | End: 2018-07-18

## 2018-05-01 NOTE — TELEPHONE ENCOUNTER
Pt requesting 90-day supply Rx sent to [facility]. Per OV notes, pt was to titrate up on dose up to 10mg QHS if tolerated. LMTCB for patient to verify current dose.     Medication: Donepezil 5mg    Date of last refill: 4/5/2018  (#60/1)  Date last fi

## 2018-05-03 ENCOUNTER — OFFICE VISIT (OUTPATIENT)
Dept: FAMILY MEDICINE CLINIC | Facility: CLINIC | Age: 83
End: 2018-05-03

## 2018-05-03 VITALS
WEIGHT: 104.19 LBS | BODY MASS INDEX: 20 KG/M2 | DIASTOLIC BLOOD PRESSURE: 61 MMHG | TEMPERATURE: 98 F | SYSTOLIC BLOOD PRESSURE: 138 MMHG | HEART RATE: 109 BPM

## 2018-05-03 DIAGNOSIS — Q23.1 AORTIC STENOSIS WITH BICUSPID VALVE: ICD-10-CM

## 2018-05-03 DIAGNOSIS — Q23.0 AORTIC STENOSIS WITH BICUSPID VALVE: ICD-10-CM

## 2018-05-03 DIAGNOSIS — L89.151 DECUBITUS ULCER OF SACRAL REGION, STAGE 1: ICD-10-CM

## 2018-05-03 DIAGNOSIS — E03.9 ACQUIRED HYPOTHYROIDISM: ICD-10-CM

## 2018-05-03 DIAGNOSIS — R54 FRAIL ELDERLY: ICD-10-CM

## 2018-05-03 DIAGNOSIS — F33.1 MAJOR DEPRESSIVE DISORDER, RECURRENT EPISODE, MODERATE WITH ANXIOUS DISTRESS (HCC): ICD-10-CM

## 2018-05-03 DIAGNOSIS — R64 CACHEXIA (HCC): ICD-10-CM

## 2018-05-03 DIAGNOSIS — Z00.00 MEDICARE ANNUAL WELLNESS VISIT, SUBSEQUENT: Primary | ICD-10-CM

## 2018-05-03 DIAGNOSIS — I77.9 BILATERAL CAROTID ARTERY DISEASE (HCC): ICD-10-CM

## 2018-05-03 PROCEDURE — 99214 OFFICE O/P EST MOD 30 MIN: CPT | Performed by: FAMILY MEDICINE

## 2018-05-03 PROCEDURE — G0439 PPPS, SUBSEQ VISIT: HCPCS | Performed by: FAMILY MEDICINE

## 2018-05-03 RX ORDER — MIRTAZAPINE 7.5 MG/1
1 TABLET, FILM COATED ORAL DAILY
Refills: 1 | COMMUNITY
Start: 2018-04-17 | End: 2020-01-01

## 2018-05-03 RX ORDER — LEVOTHYROXINE SODIUM 0.07 MG/1
75 TABLET ORAL
Qty: 30 TABLET | Refills: 2 | Status: SHIPPED | OUTPATIENT
Start: 2018-05-03 | End: 2018-12-05

## 2018-05-03 RX ORDER — LEVOTHYROXINE SODIUM 0.05 MG/1
50 TABLET ORAL
Qty: 90 TABLET | Refills: 1 | Status: CANCELLED | OUTPATIENT
Start: 2018-05-03

## 2018-05-03 NOTE — TELEPHONE ENCOUNTER
Spoke with daughter, Elisha Gasca (ok per HIPAA) states that patient is doing very well on 5mg dose. Her appetite has improved greatly and is less confused. Had 2nd antidepressant added to help with appetite and medical marijuana was increased as well.      [address]

## 2018-05-03 NOTE — PROGRESS NOTES
Review of Systems     Physical Exam              HPI:   Latasha Moise is a 80year old female who presents for a Medicare Subsequent Annual Wellness visit (Pt already had Initial Annual Wellness), hypothyroidism, weight loss, carotid artery disease, d or more medical conditions?: 1-Yes  Have you fallen in the last 12 months?: 1-Yes  Do you accidently lose urine?: 1-Yes  Do you have difficulty seeing?: 0-No  Do you have any difficulty walking or getting up?: 1-Yes  Do you have any tripping hazards?: 0-No She has Hearing problems based on screening of functional status. Hearing Problems?: Yes     She has Walking problems based on screening of functional status.    Difficulty walking?: Yes   She has problems with Daily Activities based on screening of fun Protein malnutrition (HCC)     CKD (chronic kidney disease) stage 3, GFR 30-59 ml/min     Carotid artery disease (HCC)     Major depressive disorder, recurrent episode, moderate with anxious distress (HCC)     Oropharyngeal dysphagia     Aortic valve steno Fexofenadine HCl (ALLEGRA ALLERGY OR) Take 1 tablet by mouth daily as needed. Cyanocobalamin (VITAMIN B-12 OR) Take 1 capsule by mouth daily. Cholecalciferol (VITAMIN D) 1000 UNITS Oral Tab Take 1 capsule by mouth daily.    multivitamin Oral Tab Genaro Baugh history that includes laminectomy (1982); removal gallbladder (1950); colonoscopy (N/A, 3/7/2017); appendectomy; and egd. Her family history includes Diabetes in her mother; Stroke in her father.    SOCIAL HISTORY:   She  reports that she has never smoke color.  Neurologic: Muscle wasting, weak, abnormal gait. Psych: Occasionally smiles during interview. Speaks a few words.     Vaccination History     Immunization History   Administered Date(s) Administered   • Fluad High dose 72 yr and older (06396) 09/2 weight. - Levothyroxine Sodium 75 MCG Oral Tab; Take 1 tablet (75 mcg total) by mouth before breakfast.  Dispense: 30 tablet; Refill: 2  - TSH+FREE T4; Future    7. Frail elderly  Patient's mobility is severely limited. Continue to use walker if able. Screen every 10 years There are no preventive care reminders to display for this patient. Update Health Maintenance if applicable    Flex Sigmoidoscopy Screen every 10 years No results found for this or any previous visit. No flowsheet data found.      Patricia found This may be covered with your prescription benefits, but Medicare does not cover unless Medically needed    Zoster  Not covered by Medicare Part B No vaccine history found This may be covered with your pharmacy  prescription benefits

## 2018-05-03 NOTE — PATIENT INSTRUCTIONS
Recommended Websites for Advanced Directives    [employer]no. org/publications/Documents/personal_dec. pdf  An information packet, including necessary form from the [address] website. http://www. [place]state. [place]public/books/adv

## 2018-05-10 ENCOUNTER — TELEPHONE (OUTPATIENT)
Dept: FAMILY MEDICINE CLINIC | Facility: CLINIC | Age: 83
End: 2018-05-10

## 2018-05-10 NOTE — TELEPHONE ENCOUNTER
Mariel Moncada wants Dr Renata Irwin to know that Greta Loss still has a cough.  She said her O2 sat was 97% and her lungs are clear    Mariel Moncada also advised that wound care treatment plan ok'd by Dr Renata Irwin

## 2018-05-10 NOTE — TELEPHONE ENCOUNTER
Spoke to López george and pt's skin is intact now. Wants to go back to barrier cream and olive oil for the wound care. Alondra Buchanan for?

## 2018-05-10 NOTE — TELEPHONE ENCOUNTER
Milagros from [facility] called and said she needs a recommendation to change wound care for Mozell Galeazzi.

## 2018-05-16 ENCOUNTER — APPOINTMENT (OUTPATIENT)
Dept: HEMATOLOGY/ONCOLOGY | Age: 83
End: 2018-05-16
Attending: INTERNAL MEDICINE
Payer: MEDICARE

## 2018-05-23 ENCOUNTER — OFFICE VISIT (OUTPATIENT)
Dept: HEMATOLOGY/ONCOLOGY | Age: 83
End: 2018-05-23
Attending: INTERNAL MEDICINE
Payer: MEDICARE

## 2018-05-23 VITALS
OXYGEN SATURATION: 98 % | WEIGHT: 103.38 LBS | BODY MASS INDEX: 20 KG/M2 | SYSTOLIC BLOOD PRESSURE: 124 MMHG | RESPIRATION RATE: 18 BRPM | TEMPERATURE: 98 F | DIASTOLIC BLOOD PRESSURE: 73 MMHG | HEART RATE: 108 BPM

## 2018-05-23 DIAGNOSIS — E83.52 HYPERCALCEMIA: ICD-10-CM

## 2018-05-23 DIAGNOSIS — D64.9 ANEMIA, UNSPECIFIED TYPE: Primary | ICD-10-CM

## 2018-05-23 DIAGNOSIS — R63.4 WEIGHT LOSS: ICD-10-CM

## 2018-05-23 PROCEDURE — 99213 OFFICE O/P EST LOW 20 MIN: CPT | Performed by: INTERNAL MEDICINE

## 2018-05-23 NOTE — PROGRESS NOTES
Cancer Center Progress Note  Patient Name: Jose Georges   YOB: 1931   Medical Record Number: [id number]   CSN: [id number]   Attending Physician: Tamy Santiago M.D.        Date of Visit: 5/23/2018    Chief Complaint:  Patient present started marinol, with some stabilization of her weight. History of Present Illness:  Pt is here for follow up. She has been started on Medical Marijuana and is using a combination of THC and CBD in an inhalation pen.   Her appetite and weight have stabil • Osteoarthritis     general   • Osteoarthritis     generalized   • Problems with swallowing    • Protein malnutrition ([facility] 2/28/2017   • Seizure disorder ([facility]     last episode 3 years ago   • Seizure disorder [facility])    • Sleep disturbance    • Jerline Angelucci DAILY, Disp: 90 tablet, Rfl: 0  •  Probiotic Product (PROBIOTIC DAILY OR), Take by mouth daily. , Disp: , Rfl:   •  OLANZapine 5 MG Oral Tab, Take 7.5 tablets by mouth daily. , Disp: , Rfl: 1  •  PATIENT SUPPLIED MEDICATION, Take 20 mg by mouth daily.  Medi Examination:    Constitutional Normal - Mood and affect appropriate. Appears close to chronological age. Thin appearing, Well developed. Eyes Normal - Conjunctivae and sclerae are clear and without icterus.  Pupils are reactive and equal.   Hematologic/Ly and/or on coordination of care. The diagnosis, prognosis, and general treatment was explained to the patient and the family. Electronically Signed by: Conchis Wilkinson M.D.   [facility]

## 2018-06-14 ENCOUNTER — TELEPHONE (OUTPATIENT)
Dept: HEMATOLOGY/ONCOLOGY | Facility: HOSPITAL | Age: 83
End: 2018-06-14

## 2018-07-09 RX ORDER — LEVOTHYROXINE SODIUM 0.05 MG/1
TABLET ORAL
Qty: 90 TABLET | Refills: 1 | OUTPATIENT
Start: 2018-07-09

## 2018-07-13 ENCOUNTER — OFFICE VISIT (OUTPATIENT)
Dept: FAMILY MEDICINE CLINIC | Facility: CLINIC | Age: 83
End: 2018-07-13

## 2018-07-13 VITALS
SYSTOLIC BLOOD PRESSURE: 116 MMHG | HEART RATE: 105 BPM | BODY MASS INDEX: 19.83 KG/M2 | HEIGHT: 60 IN | WEIGHT: 101 LBS | DIASTOLIC BLOOD PRESSURE: 53 MMHG

## 2018-07-13 DIAGNOSIS — R64 CACHEXIA (HCC): ICD-10-CM

## 2018-07-13 DIAGNOSIS — Z91.89 AT HIGH RISK FOR ASPIRATION: ICD-10-CM

## 2018-07-13 DIAGNOSIS — E03.9 ACQUIRED HYPOTHYROIDISM: Primary | ICD-10-CM

## 2018-07-13 DIAGNOSIS — Z23 NEED FOR VACCINATION: ICD-10-CM

## 2018-07-13 DIAGNOSIS — R13.12 OROPHARYNGEAL DYSPHAGIA: ICD-10-CM

## 2018-07-13 DIAGNOSIS — E46 PROTEIN MALNUTRITION (HCC): ICD-10-CM

## 2018-07-13 PROCEDURE — 90732 PPSV23 VACC 2 YRS+ SUBQ/IM: CPT | Performed by: FAMILY MEDICINE

## 2018-07-13 PROCEDURE — G0009 ADMIN PNEUMOCOCCAL VACCINE: HCPCS | Performed by: FAMILY MEDICINE

## 2018-07-13 PROCEDURE — 99214 OFFICE O/P EST MOD 30 MIN: CPT | Performed by: FAMILY MEDICINE

## 2018-07-13 NOTE — PROGRESS NOTES
Pedro Pablo Martínez is a 80year old female. HPI:     Patient presents with her daughter Krishna Robison and her  Kimmy Holliday who are pleasant and supportive. Hypothyroidism:  TSH has been slightly elevated.   We have been hesitant to increase her levothyroxine du Disp:  Rfl:    multivitamin Oral Tab Take 1 tablet by mouth daily. Disp:  Rfl:    Loperamide HCl (IMODIUM A-D) 2 MG Oral Tab Take 2 mg by mouth 2 (two) times daily as needed for Diarrhea.  Disp:  Rfl:       Patient Active Problem List:     Bicuspid aortic v Easy bruising    • Fatigue    • Frail elderly 5/3/2018   • Frequent urination     at nightime   • Frequent UTI    • Gastroesophageal reflux disease 1/7/2017   • Hearing loss    • Hemorrhoids    • High blood pressure    • High cholesterol    • History of ca Eyes:        No new changes   Respiratory: Positive for cough and choking. Negative for shortness of breath and wheezing. Cardiovascular: Negative for chest pain and palpitations. Gastrointestinal: Negative for nausea, vomiting and abdominal pain. [facility])  Patient to continue with boost or Ensure, patient to use thickener. 3. Cachexia ([address]  Family to continue to encourage patient to eat. 4. Oropharyngeal dysphagia  Discussed with patient importance of using thickener to help prevent aspiration.

## 2018-07-18 DIAGNOSIS — F02.80 LATE ONSET ALZHEIMER'S DISEASE WITHOUT BEHAVIORAL DISTURBANCE (HCC): ICD-10-CM

## 2018-07-18 DIAGNOSIS — G30.1 LATE ONSET ALZHEIMER'S DISEASE WITHOUT BEHAVIORAL DISTURBANCE (HCC): ICD-10-CM

## 2018-07-18 RX ORDER — DONEPEZIL HYDROCHLORIDE 5 MG/1
TABLET, FILM COATED ORAL
Qty: 90 TABLET | Refills: 0 | Status: SHIPPED | OUTPATIENT
Start: 2018-07-18 | End: 2018-08-02

## 2018-07-18 RX ORDER — POTASSIUM CHLORIDE 20 MEQ/1
TABLET, EXTENDED RELEASE ORAL
Qty: 90 TABLET | Refills: 0 | Status: SHIPPED | OUTPATIENT
Start: 2018-07-18 | End: 2019-02-06

## 2018-07-18 NOTE — TELEPHONE ENCOUNTER
Medication: Aricept    Date of last refill: 5/3/18 for #90/0 additional refills  [place]    Last office visit: 4/5/18  Due back to clinic per last office note:  4 months  Date next office visit scheduled:  8/2/18

## 2018-07-20 ENCOUNTER — TELEPHONE (OUTPATIENT)
Dept: FAMILY MEDICINE CLINIC | Facility: CLINIC | Age: 83
End: 2018-07-20

## 2018-07-20 NOTE — TELEPHONE ENCOUNTER
Just a Antolin Comer is calling to let Dr Nico Ortiz know that she ordered her mom a restrain belt because she has been getting up at night an wandering around  she sent the information to a pharmacy on line, the pharmacy will be sending Dr Nico Ortiz a order.  Deidre Gill

## 2018-08-02 ENCOUNTER — OFFICE VISIT (OUTPATIENT)
Dept: NEUROLOGY | Facility: CLINIC | Age: 83
End: 2018-08-02
Payer: MEDICARE

## 2018-08-02 VITALS — SYSTOLIC BLOOD PRESSURE: 102 MMHG | RESPIRATION RATE: 18 BRPM | DIASTOLIC BLOOD PRESSURE: 62 MMHG | HEART RATE: 64 BPM

## 2018-08-02 DIAGNOSIS — F02.80 LATE ONSET ALZHEIMER'S DISEASE WITHOUT BEHAVIORAL DISTURBANCE (HCC): ICD-10-CM

## 2018-08-02 DIAGNOSIS — G30.1 LATE ONSET ALZHEIMER'S DISEASE WITHOUT BEHAVIORAL DISTURBANCE (HCC): ICD-10-CM

## 2018-08-02 PROCEDURE — 99214 OFFICE O/P EST MOD 30 MIN: CPT | Performed by: OTHER

## 2018-08-02 RX ORDER — DONEPEZIL HYDROCHLORIDE 5 MG/1
TABLET, FILM COATED ORAL
Qty: 90 TABLET | Refills: 1 | Status: SHIPPED | OUTPATIENT
Start: 2018-08-02 | End: 2019-01-27

## 2018-08-02 NOTE — PATIENT INSTRUCTIONS
Refill policies:    • Allow 2-3 business days for refills; controlled substances may take longer.   • Contact your pharmacy at least 5 days prior to running out of medication and have them send an electronic request or submit request through the “request re entire amount billed. Precertification and Prior Authorizations: If your physician has recommended that you have a procedure or additional testing performed.   [facility]) will contact your insurance carrier to obtain pre-certi

## 2018-08-02 NOTE — PROGRESS NOTES
Patient here to follow up regarding episodes of confusion. States she feels a little more confused since last visit. Here to discuss the next steps.

## 2018-08-02 NOTE — PROGRESS NOTES
[facility] Neurology outpatient progress note  Date of service: 8/2/2018    Patient here to follow up regarding episodes of confusion. States she feels a little more confused since last visit.    She has been eating more, also plays [employer] checkers sometime per d , Disp: , Rfl:   •  Cyanocobalamin (VITAMIN B-12 OR), Take 1 capsule by mouth daily. , Disp: , Rfl:   •  Cholecalciferol (VITAMIN D) 1000 UNITS Oral Tab, Take 1 capsule by mouth daily. , Disp: , Rfl:   •  multivitamin Oral Tab, Take 1 tablet by mouth daily. , 12/21/2017   • Mitral valve stenosis 12/21/2017   • Moderate aortic stenosis 01/14/2016    at least moderate aortic regurgitation   • Moderate mitral stenosis 01/14/2016    [facility]   • Osteoarthritis     general   • Ramona So with this encounter. The patient indicates understanding of these issues and agrees with the plan. Discussed with patient in detail regarding the adverse and side effects of the medications.   RTC 4-6 months,  exelon patch is next option     Dunreith Anette Jones

## 2018-08-03 ENCOUNTER — TELEPHONE (OUTPATIENT)
Dept: FAMILY MEDICINE CLINIC | Facility: CLINIC | Age: 83
End: 2018-08-03

## 2018-08-03 NOTE — TELEPHONE ENCOUNTER
Yoon from [facility] home health is calling to let Dr Mirza Mccullough know that [address] had a fall yesterday evening, she hit her head on the right side in the back, she has a little naomi where she hit her head but her pain is at 7-9 on the scale, daughter g

## 2018-08-03 NOTE — TELEPHONE ENCOUNTER
Per Amado Dubois D.O., Yoon was given a verbal order to give [place] Ashing Tylenol 500 mg Take 2 tablets (1000 mg) by mouth every 8 hours as needed for pain, max of 3,000 mg in 24 hours.

## 2018-08-09 ENCOUNTER — MED REC SCAN ONLY (OUTPATIENT)
Dept: FAMILY MEDICINE CLINIC | Facility: CLINIC | Age: 83
End: 2018-08-09

## 2018-08-21 ENCOUNTER — TELEPHONE (OUTPATIENT)
Dept: HEMATOLOGY/ONCOLOGY | Facility: HOSPITAL | Age: 83
End: 2018-08-21

## 2018-08-21 NOTE — TELEPHONE ENCOUNTER
Oracio from [facility] reports that patients HR is 48 and irregular. Coarse rubbing sound heard. Pt is very fatigued and short of breath. Patient should go to the ER. Oracio verbalized understanding. Dr. Kiki Carr informed.

## 2018-08-28 ENCOUNTER — TELEPHONE (OUTPATIENT)
Dept: FAMILY MEDICINE CLINIC | Facility: CLINIC | Age: 83
End: 2018-08-28

## 2018-08-28 ENCOUNTER — PATIENT OUTREACH (OUTPATIENT)
Dept: CASE MANAGEMENT | Age: 83
End: 2018-08-28

## 2018-08-28 NOTE — PROGRESS NOTES
Contacted pt for condition update since discharge from [facility] on 8/24/18. Spoke to jose manuel Steele per NEREIDA, she stated her mother went to rehab at [facility] in [facility].  Inquired about dx during pt's hospital stay, dtr stated the pt

## 2018-08-31 RX ORDER — LEVOTHYROXINE SODIUM 0.05 MG/1
TABLET ORAL
Qty: 90 TABLET | Refills: 0 | Status: SHIPPED | OUTPATIENT
Start: 2018-08-31 | End: 2019-05-14 | Stop reason: ALTCHOICE

## 2018-08-31 NOTE — TELEPHONE ENCOUNTER
Levothyroxine approved qty 90 NR  Patient due to complete thyroid labs  Copy of lab orders mailed to patient

## 2018-09-14 ENCOUNTER — MED REC SCAN ONLY (OUTPATIENT)
Dept: FAMILY MEDICINE CLINIC | Facility: CLINIC | Age: 83
End: 2018-09-14

## 2018-10-09 ENCOUNTER — MED REC SCAN ONLY (OUTPATIENT)
Dept: HEMATOLOGY/ONCOLOGY | Facility: HOSPITAL | Age: 83
End: 2018-10-09

## 2018-11-05 ENCOUNTER — TELEPHONE (OUTPATIENT)
Dept: FAMILY MEDICINE CLINIC | Facility: CLINIC | Age: 83
End: 2018-11-05

## 2018-11-05 NOTE — TELEPHONE ENCOUNTER
Evelin Parker with [address] called because pts heart rate is , resp 24 and /66.  Evelin Parker can be reached at [phone]

## 2018-11-06 ENCOUNTER — LAB REQUISITION (OUTPATIENT)
Dept: LAB | Facility: HOSPITAL | Age: 83
End: 2018-11-06
Payer: MEDICARE

## 2018-11-06 DIAGNOSIS — D64.9 ANEMIA: ICD-10-CM

## 2018-11-06 PROCEDURE — 80053 COMPREHEN METABOLIC PANEL: CPT | Performed by: FAMILY MEDICINE

## 2018-11-06 NOTE — TELEPHONE ENCOUNTER
Uma Jolley called back and said no respiratory issues, no urinary issues, no issues with breathing, no new symptoms she just wanted to let Dr. Maia Mendosa know what was going on. She has noticed that pt seems to be sleeping more often.   The nurse is supposed

## 2018-11-06 NOTE — TELEPHONE ENCOUNTER
lmom for Meghna Perez to call office back to discuss.   Pt does have an appt scheduled with Dr. Daina Bingham on 11/8/18

## 2018-11-06 NOTE — TELEPHONE ENCOUNTER
Any signs of infection? Example, dysuria? Worsening cough? Difficulty breathing? Or new symptoms? Chart reviewed, there are now 2 doses of levothyroxine listed, 50 mcg and 75 mcg, please verify which dose patient is on.   Please schedule patient for ap

## 2018-11-07 ENCOUNTER — APPOINTMENT (OUTPATIENT)
Dept: HEMATOLOGY/ONCOLOGY | Age: 83
End: 2018-11-07
Attending: INTERNAL MEDICINE
Payer: MEDICARE

## 2018-11-08 ENCOUNTER — OFFICE VISIT (OUTPATIENT)
Dept: FAMILY MEDICINE CLINIC | Facility: CLINIC | Age: 83
End: 2018-11-08
Payer: MEDICARE

## 2018-11-08 VITALS
SYSTOLIC BLOOD PRESSURE: 98 MMHG | HEART RATE: 82 BPM | BODY MASS INDEX: 20 KG/M2 | WEIGHT: 104 LBS | DIASTOLIC BLOOD PRESSURE: 58 MMHG

## 2018-11-08 DIAGNOSIS — F02.80 LATE ONSET ALZHEIMER'S DISEASE WITHOUT BEHAVIORAL DISTURBANCE (HCC): ICD-10-CM

## 2018-11-08 DIAGNOSIS — N18.30 CKD (CHRONIC KIDNEY DISEASE) STAGE 3, GFR 30-59 ML/MIN (HCC): ICD-10-CM

## 2018-11-08 DIAGNOSIS — R64 CACHEXIA (HCC): ICD-10-CM

## 2018-11-08 DIAGNOSIS — N17.9 ACUTE KIDNEY INJURY (HCC): ICD-10-CM

## 2018-11-08 DIAGNOSIS — E83.52 HYPERCALCEMIA: ICD-10-CM

## 2018-11-08 DIAGNOSIS — G30.1 LATE ONSET ALZHEIMER'S DISEASE WITHOUT BEHAVIORAL DISTURBANCE (HCC): ICD-10-CM

## 2018-11-08 DIAGNOSIS — Z09 HOSPITAL DISCHARGE FOLLOW-UP: Primary | ICD-10-CM

## 2018-11-08 PROCEDURE — 99214 OFFICE O/P EST MOD 30 MIN: CPT | Performed by: FAMILY MEDICINE

## 2018-11-08 PROCEDURE — 1111F DSCHRG MED/CURRENT MED MERGE: CPT | Performed by: FAMILY MEDICINE

## 2018-11-08 NOTE — PROGRESS NOTES
Isabella Marcus is a 80year old female. HPI:     Patient is accompanied by her daughter Pretty Davila and her , they are both very pleasant and supportive. Family helps with history and review of systems.     Hospitalized [facility] 8/21 Oral Tab Take 1 capsule by mouth daily. Disp:  Rfl:    multivitamin Oral Tab Take 1 tablet by mouth daily. Disp:  Rfl:    Loperamide HCl (IMODIUM A-D) 2 MG Oral Tab Take 2 mg by mouth 2 (two) times daily as needed for Diarrhea.  Disp:  Rfl:       Patient Ac atrophy, mild with extensive reinnervation.  2) type II B fiber atrophy    • Depression    • Diarrhea, unspecified    • Easy bruising    • Fatigue    • Frail elderly 5/3/2018   • Frequent urination     at nightime   • Frequent UTI    • Gastroesophageal refl Never Used    Alcohol use: Yes      Alcohol/week: 0.0 oz      Comment: just on holidays    Drug use: Yes      Types: Cannabis      Comment: medical           REVIEW OF SYSTEMS:   Review of Systems   Constitutional: Negative for chills and fever.    HENT: Po Chloride      101 - 111 mmol/L 105 105   Carbon Dioxide, Total      22.0 - 32.0 mmol/L 28.0 30.0   ANION GAP      0 - 18 mmol/L 6    BUN      8 - 20 mg/dL 37 (H) 35 (H)   CREATININE      0.55 - 1.02 mg/dL 1.13 (H) 1.29 (H)   BUN/CREA RATIO      10.0 - 20 as well as possible. 6. Late onset Alzheimer's disease without behavioral disturbance  Patient followed by neurologist.        Return in about 3 months (around 2/8/2019) for Chronic Conditions and as needed.

## 2018-11-09 ENCOUNTER — LAB REQUISITION (OUTPATIENT)
Dept: LAB | Age: 83
End: 2018-11-09
Payer: MEDICARE

## 2018-11-09 ENCOUNTER — TELEPHONE (OUTPATIENT)
Dept: FAMILY MEDICINE CLINIC | Facility: CLINIC | Age: 83
End: 2018-11-09

## 2018-11-09 DIAGNOSIS — I12.9 HYPERTENSIVE CHRONIC KIDNEY DISEASE WITH STAGE 1 THROUGH STAGE 4 CHRONIC KIDNEY DISEASE, OR UNSPECIFIED CHRONIC KIDNEY DISEASE: ICD-10-CM

## 2018-11-09 DIAGNOSIS — N18.30 CHRONIC KIDNEY DISEASE, STAGE III (MODERATE) (HCC): ICD-10-CM

## 2018-11-09 PROCEDURE — 85025 COMPLETE CBC W/AUTO DIFF WBC: CPT | Performed by: FAMILY MEDICINE

## 2018-11-09 NOTE — TELEPHONE ENCOUNTER
Instructions called in to [facility] and Asked pt after reviewing message to call office with any questions.

## 2018-11-09 NOTE — TELEPHONE ENCOUNTER
Yoon with [facility] called to report some abnormal findings. Lung sounds and pressure ulcer. Yoon can be reached at [phone].

## 2018-11-09 NOTE — TELEPHONE ENCOUNTER
Spoke to Yoon pt has stage 2 ulcer to right buttock now and they want to be able to use barrier cream with olive oil mixture and apply it with each episode of incontinence?     Rhonchi most notable with exhalation so she is asking if pt could possibly st

## 2018-11-09 NOTE — TELEPHONE ENCOUNTER
Okay for wound care treatment. Regarding the rhonchi, when I saw Antwan Moreno yesterday her rhonchi were loose and cleared with cough, no wheezing, faint low basilar crackles therefore I do not think that she would benefit from albuterol nebs at this time.

## 2018-11-10 PROBLEM — E83.52 HYPERCALCEMIA: Status: ACTIVE | Noted: 2018-11-10

## 2018-11-10 PROBLEM — Z09 HOSPITAL DISCHARGE FOLLOW-UP: Status: ACTIVE | Noted: 2018-11-10

## 2018-11-10 PROBLEM — N17.9 ACUTE KIDNEY INJURY (HCC): Status: ACTIVE | Noted: 2018-11-10

## 2018-11-10 NOTE — PATIENT INSTRUCTIONS
Alzheimer's Dementia and Caregiver Support   Alzheimer's dementia (AD) is a chronic, progressive condition that affects the brain.  It causes a gradual loss of memory and higher intellectual functions. A person with AD may have trouble recognizing mitul Use lists, signs, family photos, clocks, and calendars as memory aids. Label cabinets and drawers. Try to distract, not confront, the person.  When he or she becomes frustrated or upset, direct the person’s attention to eating or some other interesting acti Call your loved-one's healthcare provider right away if any of these occur:  · Frequent falls  · The person refuses to eat or drink  · Violent behavior or behavior becomes too difficult to manage at home  · Increased drowsiness, or failure to respond palak

## 2018-11-14 ENCOUNTER — APPOINTMENT (OUTPATIENT)
Dept: HEMATOLOGY/ONCOLOGY | Age: 83
End: 2018-11-14
Attending: INTERNAL MEDICINE
Payer: MEDICARE

## 2018-11-14 ENCOUNTER — TELEPHONE (OUTPATIENT)
Dept: FAMILY MEDICINE CLINIC | Facility: CLINIC | Age: 83
End: 2018-11-14

## 2018-11-14 NOTE — TELEPHONE ENCOUNTER
Андрей Gonzalez from [facility] called and said she needs to add 2 more PT visits for strengthening. This is to start 11/26/18.

## 2018-11-15 NOTE — TELEPHONE ENCOUNTER
After discussing with Dr. Orly Anne ok to add the two visits. Jordy Martinez, PT and lmom with instructions. Asked pt after reviewing message to call office with any questions.

## 2018-11-19 ENCOUNTER — TELEPHONE (OUTPATIENT)
Dept: FAMILY MEDICINE CLINIC | Facility: CLINIC | Age: 83
End: 2018-11-19

## 2018-11-19 DIAGNOSIS — Q23.1 BICUSPID AORTIC VALVE: ICD-10-CM

## 2018-11-19 DIAGNOSIS — R64 CACHEXIA (HCC): ICD-10-CM

## 2018-11-19 DIAGNOSIS — I77.9 BILATERAL CAROTID ARTERY DISEASE, UNSPECIFIED TYPE (HCC): ICD-10-CM

## 2018-11-19 DIAGNOSIS — G30.1 LATE ONSET ALZHEIMER'S DISEASE WITHOUT BEHAVIORAL DISTURBANCE (HCC): ICD-10-CM

## 2018-11-19 DIAGNOSIS — E03.9 ACQUIRED HYPOTHYROIDISM: ICD-10-CM

## 2018-11-19 DIAGNOSIS — F02.80 LATE ONSET ALZHEIMER'S DISEASE WITHOUT BEHAVIORAL DISTURBANCE (HCC): ICD-10-CM

## 2018-11-19 DIAGNOSIS — E46 PROTEIN MALNUTRITION (HCC): ICD-10-CM

## 2018-11-19 DIAGNOSIS — N17.9 ACUTE KIDNEY INJURY (HCC): ICD-10-CM

## 2018-11-19 DIAGNOSIS — N18.30 CKD (CHRONIC KIDNEY DISEASE) STAGE 3, GFR 30-59 ML/MIN (HCC): Primary | ICD-10-CM

## 2018-11-19 DIAGNOSIS — F33.1 MAJOR DEPRESSIVE DISORDER, RECURRENT EPISODE, MODERATE WITH ANXIOUS DISTRESS (HCC): ICD-10-CM

## 2018-11-19 NOTE — TELEPHONE ENCOUNTER
YoonRN called from [facility] with condition update and she said that pt's lung sounds are not good today that she can hear coarse sounds in the bases and wheezing when pt inhales. She is going to go out there on Wednesday for another check.   Pt had PT today

## 2018-11-20 ENCOUNTER — TELEPHONE (OUTPATIENT)
Dept: FAMILY MEDICINE CLINIC | Facility: CLINIC | Age: 83
End: 2018-11-20

## 2018-11-20 NOTE — TELEPHONE ENCOUNTER
Please call patient's daughter, Leonora Vital and ask her how the patient is doing. We may need to prescribe antibiotics to cover patient for aspiration pneumonia.

## 2018-11-20 NOTE — TELEPHONE ENCOUNTER
Spoke to Cedric and she has not seen pt in almost a week so she goes by what the home health tells her. She will be going to see her Mom later today and she will take a listen and let office know.     Daughter also wants to have pt started on palliative ca

## 2018-11-21 ENCOUNTER — TELEPHONE (OUTPATIENT)
Dept: FAMILY MEDICINE CLINIC | Facility: CLINIC | Age: 83
End: 2018-11-21

## 2018-11-21 RX ORDER — AZITHROMYCIN 250 MG/1
TABLET, FILM COATED ORAL
Qty: 6 TABLET | Refills: 0 | Status: SHIPPED | OUTPATIENT
Start: 2018-11-21 | End: 2019-05-14 | Stop reason: ALTCHOICE

## 2018-11-21 NOTE — TELEPHONE ENCOUNTER
Laura CARBALLO called to report that the patient sounds much better today. She does still have coarse rhonchi in posterior upper lungss. States patient is effectively coughing up sputum, is afebrile and her appetite and color have improved.     Per Dr Alayna Ramsey,

## 2018-11-23 NOTE — TELEPHONE ENCOUNTER
Called and lmom for Yoon that palliative care would like to be started. Can they send over the orders for Dr. Gerardo Meyer to get started? Referral placed in system for palliative care    Also spoke to [facility] and she is aware of this as well.

## 2018-11-28 ENCOUNTER — MED REC SCAN ONLY (OUTPATIENT)
Dept: HEMATOLOGY/ONCOLOGY | Facility: HOSPITAL | Age: 83
End: 2018-11-28

## 2018-11-28 ENCOUNTER — LAB REQUISITION (OUTPATIENT)
Dept: LAB | Facility: HOSPITAL | Age: 83
End: 2018-11-28
Payer: MEDICARE

## 2018-11-28 DIAGNOSIS — I12.9 HYPERTENSIVE CHRONIC KIDNEY DISEASE WITH STAGE 1 THROUGH STAGE 4 CHRONIC KIDNEY DISEASE, OR UNSPECIFIED CHRONIC KIDNEY DISEASE: ICD-10-CM

## 2018-11-28 DIAGNOSIS — N18.30 CHRONIC KIDNEY DISEASE, STAGE III (MODERATE) (HCC): ICD-10-CM

## 2018-11-28 PROCEDURE — 84439 ASSAY OF FREE THYROXINE: CPT | Performed by: FAMILY MEDICINE

## 2018-11-28 PROCEDURE — 84443 ASSAY THYROID STIM HORMONE: CPT | Performed by: FAMILY MEDICINE

## 2018-12-05 DIAGNOSIS — E03.9 ACQUIRED HYPOTHYROIDISM: ICD-10-CM

## 2018-12-05 RX ORDER — LEVOTHYROXINE SODIUM 0.07 MG/1
TABLET ORAL
Qty: 90 TABLET | Refills: 1 | Status: SHIPPED | OUTPATIENT
Start: 2018-12-05 | End: 2019-02-22

## 2018-12-19 NOTE — TELEPHONE ENCOUNTER
Urmila Glover called in and said that pt told her that this morning at 1am pt went to go use the bathroom and she fell on her back. She rates the pain at a 7. Latonya did an assessment and no bruising, swelling nor redness noted to area.   Pt did take a tylenol

## 2018-12-19 NOTE — TELEPHONE ENCOUNTER
Called HRAIS Hanks with instructions but could not get thru so also called pt's daughter as well with instructions and she said that when she was there earlier the patient was not complaining of any pain.   So unsure at this point if pt will go to immediate ca

## 2018-12-19 NOTE — TELEPHONE ENCOUNTER
Recommend patient be taken to immediate care or emergency department, recommend x-ray of the vertebral spine.

## 2018-12-20 NOTE — TELEPHONE ENCOUNTER
Order for Posey restraint put in system.   Spoke to pt's daughter and the order for this was faxed to her at [phone]    She also said that she was at pt's home last night and pt was up walking around and that the pain was not bad so it was decided to h

## 2019-01-01 ENCOUNTER — TELEPHONE (OUTPATIENT)
Dept: FAMILY MEDICINE CLINIC | Facility: CLINIC | Age: 84
End: 2019-01-01

## 2019-01-01 DIAGNOSIS — N18.30 CKD (CHRONIC KIDNEY DISEASE) STAGE 3, GFR 30-59 ML/MIN (HCC): ICD-10-CM

## 2019-01-01 RX ORDER — POTASSIUM CHLORIDE 20 MEQ/1
TABLET, EXTENDED RELEASE ORAL
Qty: 30 TABLET | Refills: 0 | Status: SHIPPED | OUTPATIENT
Start: 2019-01-01

## 2019-01-01 RX ORDER — POTASSIUM CHLORIDE 20 MEQ/1
TABLET, EXTENDED RELEASE ORAL
Qty: 90 TABLET | Refills: 0 | OUTPATIENT
Start: 2019-01-01

## 2019-01-02 ENCOUNTER — TELEPHONE (OUTPATIENT)
Dept: FAMILY MEDICINE CLINIC | Facility: CLINIC | Age: 84
End: 2019-01-02

## 2019-01-02 NOTE — TELEPHONE ENCOUNTER
Arianna Blake with [facility] called states pt was seen today and has had a continous cough not productive but sounds wet. Arianna Blake would like approval for OTC allergy medicine and if so would like to know which one. Arianna Blake can be reached at [phone].

## 2019-01-02 NOTE — TELEPHONE ENCOUNTER
Azar Bowling states that patient's lungs are clear. She is having some clear drainage. Lillian Mancera that per Dr Natty Lucas should try sudafed OTC as directed.   Ilia Monroy will contact patient's daughter

## 2019-01-09 ENCOUNTER — TELEPHONE (OUTPATIENT)
Dept: FAMILY MEDICINE CLINIC | Facility: CLINIC | Age: 84
End: 2019-01-09

## 2019-01-09 NOTE — TELEPHONE ENCOUNTER
David Menchaca is calling to let Dr Manuela Mcmillan know that [facility] has improved, but the pressure ulcer on sacrum has went to a stage 2, Please call David Menchaca at [phone] to let her know what Dr Manuela Mcmillan recommends

## 2019-01-10 NOTE — TELEPHONE ENCOUNTER
Called HARIS García and left orders for pt to for pt to be seen in wound clinic can discuss with pt's daughter to make arrangements for pt to be seen at the [facility] wound clinic. Asked pt after reviewing message to call office with any questions.

## 2019-01-27 DIAGNOSIS — G30.1 LATE ONSET ALZHEIMER'S DISEASE WITHOUT BEHAVIORAL DISTURBANCE (HCC): ICD-10-CM

## 2019-01-27 DIAGNOSIS — F02.80 LATE ONSET ALZHEIMER'S DISEASE WITHOUT BEHAVIORAL DISTURBANCE (HCC): ICD-10-CM

## 2019-01-28 RX ORDER — DONEPEZIL HYDROCHLORIDE 5 MG/1
TABLET, FILM COATED ORAL
Qty: 90 TABLET | Refills: 0 | Status: SHIPPED | OUTPATIENT
Start: 2019-01-28 | End: 2019-05-14

## 2019-01-28 NOTE — TELEPHONE ENCOUNTER
Medication: DONEPEZIL HCL 5 MG Oral Tab    Date of last refill: 08/02/18 (#90/1)  [place]    Last office visit: 8/2/2018  Due back to clinic per last office note:  Around 02/02/19  Date next office visit scheduled:

## 2019-02-04 ENCOUNTER — TELEPHONE (OUTPATIENT)
Dept: FAMILY MEDICINE CLINIC | Facility: CLINIC | Age: 84
End: 2019-02-04

## 2019-02-04 NOTE — TELEPHONE ENCOUNTER
Scott Zuleta is calling to see if Dr Nico Ortiz can change her Potassium pills form the E/R to the regular pill, she needs to start crushing up her pill and the pharmacy suggested that she be on the regular pill instead of the E/R because they are not meant to be c

## 2019-02-05 ENCOUNTER — TELEPHONE (OUTPATIENT)
Dept: NEUROLOGY | Facility: CLINIC | Age: 84
End: 2019-02-05

## 2019-02-05 RX ORDER — POTASSIUM CHLORIDE 1.5 G/1.77G
20 POWDER, FOR SOLUTION ORAL DAILY
Qty: 90 EACH | Refills: 0 | Status: CANCELLED | OUTPATIENT
Start: 2019-02-05

## 2019-02-06 ENCOUNTER — TELEPHONE (OUTPATIENT)
Dept: FAMILY MEDICINE CLINIC | Facility: CLINIC | Age: 84
End: 2019-02-06

## 2019-02-06 NOTE — TELEPHONE ENCOUNTER
[facility] - KAREN RN called to inform Dr. Winifred Echevarria about pts post nasal drip with cough, lungs sound clear. Patients oxygen level is at 96-97%. Pt has been fighting this congestion for weeks now.  Pt also had one dizzy spell yesterday that resolved immedia

## 2019-02-06 NOTE — TELEPHONE ENCOUNTER
Spoke to [place] and gave her the instructions and directions. She verbalized understanding. Pt has been taking Sudafed but it is no longer helping so she will have pt stop this and start the Allegra if she is not already taking it.

## 2019-02-13 ENCOUNTER — TELEPHONE (OUTPATIENT)
Dept: FAMILY MEDICINE CLINIC | Facility: CLINIC | Age: 84
End: 2019-02-13

## 2019-02-13 ENCOUNTER — HOSPITAL ENCOUNTER (OUTPATIENT)
Dept: GENERAL RADIOLOGY | Age: 84
Discharge: HOME | End: 2019-02-13
Attending: FAMILY MEDICINE
Payer: MEDICARE

## 2019-02-13 ENCOUNTER — TELEPHONE (OUTPATIENT)
Dept: NEUROLOGY | Facility: CLINIC | Age: 84
End: 2019-02-13

## 2019-02-13 DIAGNOSIS — R09.89 RESPIRATORY CRACKLES AT BOTH LUNG BASES: Primary | ICD-10-CM

## 2019-02-13 DIAGNOSIS — R05.3 CHRONIC COUGH: ICD-10-CM

## 2019-02-13 DIAGNOSIS — R09.89 RESPIRATORY CRACKLES AT BOTH LUNG BASES: ICD-10-CM

## 2019-02-13 DIAGNOSIS — R30.0 DYSURIA: ICD-10-CM

## 2019-02-13 PROCEDURE — 71046 X-RAY EXAM CHEST 2 VIEWS: CPT | Performed by: FAMILY MEDICINE

## 2019-02-13 NOTE — TELEPHONE ENCOUNTER
Patient has had a couple episodes over the past week. On 2/9/19 in the AM hours pt took off all of her clothes and sat on the edge of her bed. She was confused. On  2/10/19 pt got out of her appartment and walked downstairs without her walker and sat on the bottom step until a neighbor found hre. On 2/12 patient fell at home. Patient will be seeing her PCP, Dr David Ragsdale in a day or two for fluid in her lungs. Daughter has bought a bed alarm and bed rail which are now in place in the home.

## 2019-02-13 NOTE — TELEPHONE ENCOUNTER
Edis Peterson from [facility] called with update on patient's condition. Patient has bilateral crackles in lung bases. She also has a chronic constant non-productive cough. Her O2 sats are in the high 90s.   She fell 2/12 in her home at 3:30 am and has a small purple

## 2019-02-14 ENCOUNTER — APPOINTMENT (OUTPATIENT)
Dept: LAB | Age: 84
End: 2019-02-14
Attending: FAMILY MEDICINE
Payer: MEDICARE

## 2019-02-14 DIAGNOSIS — R30.0 DYSURIA: ICD-10-CM

## 2019-02-14 PROCEDURE — 87186 SC STD MICRODIL/AGAR DIL: CPT

## 2019-02-14 PROCEDURE — 87088 URINE BACTERIA CULTURE: CPT

## 2019-02-14 PROCEDURE — 87086 URINE CULTURE/COLONY COUNT: CPT

## 2019-02-14 PROCEDURE — 81001 URINALYSIS AUTO W/SCOPE: CPT

## 2019-02-15 ENCOUNTER — TELEPHONE (OUTPATIENT)
Dept: FAMILY MEDICINE CLINIC | Facility: CLINIC | Age: 84
End: 2019-02-15

## 2019-02-15 LAB
BILIRUB UR QL STRIP.AUTO: NEGATIVE
COLOR UR AUTO: YELLOW
GLUCOSE UR STRIP.AUTO-MCNC: NEGATIVE MG/DL
KETONES UR STRIP.AUTO-MCNC: NEGATIVE MG/DL
NITRITE UR QL STRIP.AUTO: POSITIVE
PH UR STRIP.AUTO: 6 [PH] (ref 4.5–8)
PROT UR STRIP.AUTO-MCNC: NEGATIVE MG/DL
SP GR UR STRIP.AUTO: 1.01 (ref 1–1.03)
UROBILINOGEN UR STRIP.AUTO-MCNC: <2 MG/DL
WBC #/AREA URNS AUTO: >50 /HPF
WBC CLUMPS UR QL AUTO: PRESENT

## 2019-02-15 RX ORDER — POTASSIUM CHLORIDE 20 MEQ/1
20 TABLET, EXTENDED RELEASE ORAL
Qty: 90 TABLET | Refills: 1 | OUTPATIENT
Start: 2019-02-15 | End: 2019-07-21

## 2019-02-15 RX ORDER — CIPROFLOXACIN 250 MG/1
250 TABLET, FILM COATED ORAL 2 TIMES DAILY
Qty: 10 TABLET | Refills: 0 | Status: SHIPPED | OUTPATIENT
Start: 2019-02-15 | End: 2019-02-20

## 2019-02-15 NOTE — TELEPHONE ENCOUNTER
----- Message from Carina Hook DO sent at 2/14/2019  7:02 PM CST -----  Please call patient's daughter Scott Zuleta: Chest x-ray reports stable appearance of the chest.  Possible minimal volume overload. No focal consolidation is seen.    No pneumonia repor

## 2019-02-15 NOTE — TELEPHONE ENCOUNTER
Rachel for home health nurse to consult with wound care clinic. Also, we could or write an order for patient to be seen at the wound care clinic.

## 2019-02-15 NOTE — TELEPHONE ENCOUNTER
Edis Peterson is calling to let Dr Apolonia Callaway know that Audrey Conde has a Stage 2 pressure ulcer, she only sits in her recliner and they goes to bed, Edis Peterson is calling Wound care for some assistance on her ulcers.  Please call Edis Peterson at [phone] with any questio

## 2019-02-15 NOTE — TELEPHONE ENCOUNTER
lmom polly García RN with instructions. Asked pt after reviewing message to call office with any questions.

## 2019-02-15 NOTE — TELEPHONE ENCOUNTER
Outgoing call made to patient's daughter Miya Maynard. Assessment: Abnormal urinalysis, urine culture pending. Plan: Today start Cipro 250 mg twice a day, 5-day course.   Discussed with patient's daughter Miya Maynard if symptoms worsen or if new symptoms please take

## 2019-02-18 ENCOUNTER — TELEPHONE (OUTPATIENT)
Dept: FAMILY MEDICINE CLINIC | Facility: CLINIC | Age: 84
End: 2019-02-18

## 2019-02-18 DIAGNOSIS — Z87.440 HISTORY OF URINARY TRACT INFECTION: Primary | ICD-10-CM

## 2019-02-18 NOTE — TELEPHONE ENCOUNTER
Spoke with Bakari Pak and informed her of test results. She verbalized understanding and had no questions or concerns at this time.

## 2019-02-18 NOTE — TELEPHONE ENCOUNTER
----- Message from Madhuri Lynn DO sent at 2/17/2019 11:35 AM CST -----  Please call patient's daughter Krish Roper: Urine culture positive for E. coli and is sensitive to Cipro which patient was prescribed.   Recommend repeat urine culture 2-3 days after co

## 2019-02-20 ENCOUNTER — TELEPHONE (OUTPATIENT)
Dept: FAMILY MEDICINE CLINIC | Facility: CLINIC | Age: 84
End: 2019-02-20

## 2019-02-22 ENCOUNTER — TELEPHONE (OUTPATIENT)
Dept: FAMILY MEDICINE CLINIC | Facility: CLINIC | Age: 84
End: 2019-02-22

## 2019-02-22 ENCOUNTER — APPOINTMENT (OUTPATIENT)
Dept: LAB | Age: 84
End: 2019-02-22
Attending: FAMILY MEDICINE
Payer: MEDICARE

## 2019-02-22 DIAGNOSIS — Z87.440 HISTORY OF URINARY TRACT INFECTION: ICD-10-CM

## 2019-02-22 DIAGNOSIS — E03.9 ACQUIRED HYPOTHYROIDISM: ICD-10-CM

## 2019-02-22 PROCEDURE — 87086 URINE CULTURE/COLONY COUNT: CPT

## 2019-02-22 RX ORDER — LEVOTHYROXINE SODIUM 0.07 MG/1
TABLET ORAL
Qty: 90 TABLET | Refills: 1 | Status: SHIPPED | OUTPATIENT
Start: 2019-02-22 | End: 2019-07-28

## 2019-02-22 NOTE — TELEPHONE ENCOUNTER
Refill request received from [facility] for levothyroxine  Rx approved qty 90 + 1 refill per protocol

## 2019-02-26 ENCOUNTER — TELEPHONE (OUTPATIENT)
Dept: FAMILY MEDICINE CLINIC | Facility: CLINIC | Age: 84
End: 2019-02-26

## 2019-02-26 NOTE — TELEPHONE ENCOUNTER
Sebastian Santos called from [facility] and said she D/C'd Mozell Galeazzi from the agency today. No open wounds and she was nice and alert today.

## 2019-03-01 ENCOUNTER — MED REC SCAN ONLY (OUTPATIENT)
Dept: FAMILY MEDICINE CLINIC | Facility: CLINIC | Age: 84
End: 2019-03-01

## 2019-04-18 ENCOUNTER — TELEPHONE (OUTPATIENT)
Dept: NEUROLOGY | Facility: CLINIC | Age: 84
End: 2019-04-18

## 2019-04-18 NOTE — TELEPHONE ENCOUNTER
[facility] consult note for palliative care reviewed by Dr. Stefano Rodriguez and signed off for scanning. Sent for scanning.

## 2019-04-19 ENCOUNTER — MED REC SCAN ONLY (OUTPATIENT)
Dept: HEMATOLOGY/ONCOLOGY | Facility: HOSPITAL | Age: 84
End: 2019-04-19

## 2019-05-14 ENCOUNTER — OFFICE VISIT (OUTPATIENT)
Dept: NEUROLOGY | Facility: CLINIC | Age: 84
End: 2019-05-14
Payer: MEDICARE

## 2019-05-14 VITALS — DIASTOLIC BLOOD PRESSURE: 60 MMHG | HEART RATE: 78 BPM | RESPIRATION RATE: 16 BRPM | SYSTOLIC BLOOD PRESSURE: 98 MMHG

## 2019-05-14 DIAGNOSIS — G30.1 LATE ONSET ALZHEIMER'S DISEASE WITHOUT BEHAVIORAL DISTURBANCE (HCC): ICD-10-CM

## 2019-05-14 DIAGNOSIS — F02.80 LATE ONSET ALZHEIMER'S DISEASE WITHOUT BEHAVIORAL DISTURBANCE (HCC): ICD-10-CM

## 2019-05-14 PROCEDURE — 99215 OFFICE O/P EST HI 40 MIN: CPT | Performed by: OTHER

## 2019-05-14 RX ORDER — DONEPEZIL HYDROCHLORIDE 5 MG/1
TABLET, FILM COATED ORAL
Qty: 90 TABLET | Refills: 3 | Status: SHIPPED | OUTPATIENT
Start: 2019-05-14

## 2019-05-14 NOTE — PROGRESS NOTES
[facility] Neurology outpatient progress note  Date of service: 5/14/2019    Patient here to follow up regarding dementia and gait disorder. She has been eating more, also plays [place] checkers sometime per daughter.  Pt was recently seen by a podiatrist who s Disp: , Rfl:   •  Cholecalciferol (VITAMIN D) 1000 UNITS Oral Tab, Take 1 capsule by mouth daily. , Disp: , Rfl:   •  multivitamin Oral Tab, Take 1 tablet by mouth daily. , Disp: , Rfl:   •  Loperamide HCl (IMODIUM A-D) 2 MG Oral Tab, Take 2 mg by mouth 2 (t anxious distress ([facility] 7/16/2017   • Microscopic colitis 1/7/2017   • Migraines    • Mitral valve annular calcification 12/21/2017   • Mitral valve stenosis 12/21/2017   • Moderate aortic stenosis 01/14/2016    at least moderate aortic regurgitation   • Mo slow  Sensory: diminished distal PP in LEs  Gait: deferred  Romberg: deferred  Neck: supple     Test reviewed on 5/14/2019    A/P:   Major Neurocognitive disorder: stable on aricept 5 mg  Muscle atrophy: etiology unclear, multifactorial likely, I suspect M

## 2019-05-29 ENCOUNTER — TELEPHONE (OUTPATIENT)
Dept: FAMILY MEDICINE CLINIC | Facility: CLINIC | Age: 84
End: 2019-05-29

## 2019-05-29 NOTE — TELEPHONE ENCOUNTER
Nelida Siddiqui, GEE called and said that pt's daugher is asking for more PT for patient.   Nelida Siddiqui feels that pt is deconditioning and she is not doing her exercises and she does not know if daughter is just not accepting this as part of the disease process but would l

## 2019-06-24 ENCOUNTER — TELEPHONE (OUTPATIENT)
Dept: FAMILY MEDICINE CLINIC | Facility: CLINIC | Age: 84
End: 2019-06-24

## 2019-07-21 DIAGNOSIS — N18.30 CKD (CHRONIC KIDNEY DISEASE) STAGE 3, GFR 30-59 ML/MIN (HCC): Primary | ICD-10-CM

## 2019-07-22 RX ORDER — POTASSIUM CHLORIDE 20 MEQ/1
TABLET, EXTENDED RELEASE ORAL
Qty: 90 TABLET | Refills: 0 | Status: SHIPPED | OUTPATIENT
Start: 2019-07-22 | End: 2019-01-01

## 2019-07-22 NOTE — TELEPHONE ENCOUNTER
Pt requesting refill of potassium, refill approved, sent to pharmacy    Last Time Medication was Filled: 2/15/19    Last Office Visit with PCP: 11/8/2018 and asked to Return in about 3 months (around 2/8/2019)    No future appointments.     Spoke with umesh

## 2019-07-28 DIAGNOSIS — E03.9 ACQUIRED HYPOTHYROIDISM: ICD-10-CM

## 2019-07-29 RX ORDER — LEVOTHYROXINE SODIUM 0.07 MG/1
TABLET ORAL
Qty: 90 TABLET | Refills: 1 | Status: SHIPPED | OUTPATIENT
Start: 2019-07-29 | End: 2020-01-01

## 2019-09-12 ENCOUNTER — TELEPHONE (OUTPATIENT)
Dept: FAMILY MEDICINE CLINIC | Facility: CLINIC | Age: 84
End: 2019-09-12

## 2019-09-12 NOTE — TELEPHONE ENCOUNTER
Pt's daughter Shaheen Stacy) called and said she got 2-3 calls stating her mother has to be seen in the office. She was told she would get a call back after they speak with the doctor and has never got a return call.   She said she has her mom in palliative care

## 2019-09-12 NOTE — TELEPHONE ENCOUNTER
Phoned Hermelinda back. She said her mother is not out of any medication that Dr. Sid Umana prescribes at this time. Instructed her to call when she needs refills and we can forward the request the Dr. Sid Umana.

## 2019-10-04 NOTE — TELEPHONE ENCOUNTER
Pt requesting order for flu shot to be done through home health.      Prescription pended for approval.

## 2019-10-21 NOTE — TELEPHONE ENCOUNTER
Pt requesting refill of POTASSIUM CHLORIDE ER 20 MEQ Oral Tab CR refill approved, sent to [facility] for qty#30       Last Office Visit with PCP: 11/8/2018.  Return in about 3 months (around 2/8/2019) for Chronic Condition

## 2019-10-21 NOTE — TELEPHONE ENCOUNTER
Answering service call 10/18/19 5:25PM.   Coreen Haynes calling to obtain an order for hospice. Pt not feeling well. Rec- Order to start hospice given. Will inform PCP.

## 2019-10-22 NOTE — TELEPHONE ENCOUNTER
Spoke with Rena. [facility] patient is now in [facility]" due to current health state. [facility] patient had already been on palliative. Reports patient denied any concerning symptoms at this time.        She advised, Gregory Swnason is the journey coordinat

## 2020-01-01 ENCOUNTER — TELEPHONE (OUTPATIENT)
Dept: FAMILY MEDICINE CLINIC | Facility: CLINIC | Age: 85
End: 2020-01-01

## 2020-01-01 DIAGNOSIS — F02.80 LATE ONSET ALZHEIMER'S DISEASE WITHOUT BEHAVIORAL DISTURBANCE (HCC): ICD-10-CM

## 2020-01-01 DIAGNOSIS — G30.1 LATE ONSET ALZHEIMER'S DISEASE WITHOUT BEHAVIORAL DISTURBANCE (HCC): ICD-10-CM

## 2020-01-01 DIAGNOSIS — E03.9 ACQUIRED HYPOTHYROIDISM: ICD-10-CM

## 2020-01-01 RX ORDER — LEVOTHYROXINE SODIUM 0.07 MG/1
TABLET ORAL
Qty: 90 TABLET | Refills: 4 | OUTPATIENT
Start: 2020-01-01

## 2020-01-01 RX ORDER — DONEPEZIL HYDROCHLORIDE 5 MG/1
TABLET, FILM COATED ORAL
Qty: 90 TABLET | Refills: 0 | OUTPATIENT
Start: 2020-01-01

## 2020-01-27 NOTE — TELEPHONE ENCOUNTER
Pt requesting refill of LEVOTHYROXINE SODIUM 75 MCG Oral Tab    Last Time Medication was Filled: 7/29/19    Last Office Visit with PCP:11/8/2018    No future appointments.     Last thyroid test 11/28/18

## 2020-04-20 NOTE — TELEPHONE ENCOUNTER
Medication: Donepezil    Date of last refill: 5/14/19 for #90/3 additional refills  [place]    Last office visit: 5/14/19  Due back to clinic per last office note:  6-8 months  Date next office visit scheduled:  not

## 2020-09-08 NOTE — TELEPHONE ENCOUNTER
Recommend that patient's daughter Kerry Maynard contact the hospice physician to decide whether or not to check thyroid function tests.

## 2020-09-08 NOTE — TELEPHONE ENCOUNTER
Left detailed message for [facility] with recommendations. Advised she call us with any other concerns or questions.

## 2020-09-08 NOTE — TELEPHONE ENCOUNTER
We should check patient's thyroid function tests to make sure that she is not being overtreated.     Please call patient's daughter Pretty Davila, is patient still receiving home health?   If so, would they be able to draw thyroid function tests, TSH and free T4,

## 2020-09-08 NOTE — TELEPHONE ENCOUNTER
Spoke to daughter Loida Landeros. She states mother is on hospice and has been for near a year. She said the scripts usually go to hospice physician so not sure why they went to you. She also stated mother is not doing too well.  Is on puree diet and not really e

## 2020-09-13 NOTE — TELEPHONE ENCOUNTER
Phoned [facility]. They said they need an update from a physican for Physical Therapy. Phoned patients daughter Dez Villa. She is not sure if she wants Physical Therapy anymore. Dez Villa stated she would talk to [facility] and call us back if we need to do anything.
Zeke Jerez from [facility] called and said they are still waiting for paperwork to be faxed back from about 3 weeks ago. Candice also said pt's Dx is Malaise fatigue and the Dx on the referral is not a good Dx. Wagner Bergeron can be reached @ [phone].
13-Sep-2020 03:55

## 2020-09-22 ENCOUNTER — TELEPHONE (OUTPATIENT)
Dept: FAMILY MEDICINE CLINIC | Facility: CLINIC | Age: 85
End: 2020-09-22

## 2022-05-27 NOTE — TELEPHONE ENCOUNTER
I recommend pt go to [facility] in [facility] or [place]. Physician has arrived.

## 2024-12-09 NOTE — TELEPHONE ENCOUNTER
Pt's daughter called in and said they still have not yet received the plain potassium from [facility].     Called [facility] and spoke to Albina and they do not have the prescription so called the med in to  [facility] and then spoke
No

## (undated) DEVICE — FILTERLINE NASAL ADULT O2/CO2

## (undated) DEVICE — ENDOSCOPY PACK - LOWER: Brand: MEDLINE INDUSTRIES, INC.

## (undated) DEVICE — ENDOSCOPY PACK UPPER: Brand: MEDLINE INDUSTRIES, INC.

## (undated) DEVICE — TRAP 4 CPTR CHMBR N EZ INLN

## (undated) DEVICE — Device: Brand: DEFENDO AIR/WATER/SUCTION AND BIOPSY VALVE

## (undated) DEVICE — SNARE CAPTIFLEX MICRO-OVL OLY

## (undated) DEVICE — FORCEP BIOPSY RJ4 LG CAP W/ND

## (undated) NOTE — MR AVS SNAPSHOT
[facility] Kwabena YañezBilly Rocky  [phone]               Thank you for choosing us for your health care visit with Martha Varela DO.   We are glad to serve you and happy to provide you with this s · Click \"Schedule Your Test Online\"  · Follow the prompts  · If you have questions, contact Central Scheduling at [phone] at [phone]      Routine Healthcare for Women screening if you have had any abnormalities in the past or if you have an increased risk for cervical cancer. Cholesterol test: if you are age 39 or older. You may start having this test at an earlier age if you have a family history of high cholesterol. Vision test: if you are 72 or older. Remember, these are the minimum recommendations for routine tests. You and your healthcare provider must discuss what is right for you based on your symptoms and your personal and family medical history.    Many other sex partner or whose partner has more than 1 partner, or who have a sexually transmitted infection, abuse IV drugs, or plan to travel where hepatitis B is common. Pneumococcal pneumonia shot if you are age 72 or older.  You may need to get it at a younger use of estrogen and progesterone replacement with your healthcare provider. Osteoporosis prevention:  Advise 1,500 mg of calcium with 1,000 iu of vitamin D daily and daily weight bearing exercise.        Follow Up with Our Office     Return in about 4 wee These medications were sent to [address], [address] [place] [address], [phone], [phone]  [facility], [address] [id number]    Hours:  24-hours Phone:  [phone]    - potassium chloride 20 ME ? Install grab bars on the bathroom walls beside the tub, shower and toilet. ? Use a non skid rubber mat in the tub/shower. ? If you are unsteady on your feet you may want to use a shower chair/bench and a hand held shower head while bathing/showering.

## (undated) NOTE — Clinical Note
02/05/2017    Dear Dr. Evan Solitario      Thank you for referring your patient, Tomas Jaramillo to me for an evaluation. Please see my initial consult note enclosed below. Let me know if you have any questions.     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 1) denervation atrophy, mild with extensive reinnervation.  2) type II B fiber atrophy    • Moderate mitral stenosis 01/14/2016     B Fludrocortisone Acetate 0.1 MG Oral Tab Take 0.1 mg by mouth daily. Disp:  Rfl:           ROS:   A comprehensive 10 point review of systems was completed. Pertinent positives and negatives noted in the the HPI.       PHYSICAL EXAM:   BP 90/62 mmHg  Pulse 9 Vibration is absent up to knees bilaterally  Proprioception is impaired bilateral LE   Romberg is unable to test - unsteady with eyes open     Coordination:  Finger to nose normal bilaterally  Rapid alternating movements normal bilaterally  Heel to shin is muscle weakness of the bilateral lower extremities and weakness in the distal upper extremities. Etiology for her symptoms is not entirely clear at this time.   Patient has previously been evaluated by outside neurologist, will attempt to obtain records, 2/1/2017

## (undated) NOTE — MR AVS SNAPSHOT
After Visit Summary   4/19/2017    Tomas Jaramillo    MRN: [id number]           Diagnoses this Visit     Weight loss, unintentional    -  Primary     Generalized abdominal pain         Abnormal chest x-ray           Allergies     Tramadol     seizu Return in about 2 weeks (around 5/3/2017) for MD visit.       To Do List     Wednesday April 19, 2017     Imaging:  CT CHEST+ABDOMEN+PELVIS(ALL CNTRST ONLY)(NCV=[id number])        Wednesday May 03, 2017 9:30 AM     Appointment with Lydia Richard at

## (undated) NOTE — MR AVS SNAPSHOT
After Visit Summary   5/3/2017    Alva Garay    MRN: [id number]           Diagnoses this Visit     Anemia, unspecified type    -  Primary     Acute cystitis without hematuria         Weight loss, unintentional         Pulmonary nodule/lesion, Return in about 4 weeks (around 5/31/2017) for MD visit with labs.       To Do List     Wednesday May 03, 2017     LAB:  COMP METABOLIC PANEL (14)        Wednesday May 03, 2017     LAB:  URINALYSIS WITH CULTURE REFLEX        Wednesday May 10, 2017 9:20 AM Leukocyte Esterase Urine Negative  Negative   Final    Microscopic     Final    Microscopic not indicated               MyChart     Visit MyChart  You can access your MyChart to more actively manage your health care and view more details from this visit b

## (undated) NOTE — MR AVS SNAPSHOT
[facility] Kwabena  Billy Yañez [facility]  [phone]               Thank you for choosing us for your health care visit with Winnifred Ormond, MD.  We are glad to serve you and happy to provide you with this baugh IMODIUM A-D 2 MG Tabs   Generic drug:  Loperamide HCl   Take 2 mg by mouth 2 (two) times daily as needed for Diarrhea.           lisinopril 20 MG Tabs   Take 20 mg by mouth daily.    Commonly known as:  PRINIVIL,ZESTRIL           Losartan Potassium- Visit [facility] online at  [email or web address]

## (undated) NOTE — MR AVS SNAPSHOT
After Visit Summary   4/6/2017    Dileep Crowley    MRN: [id number]           Diagnoses this Visit     Weight loss, unintentional    -  Primary     Shortness of breath         Idiopathic peripheral neuropathy         Other fatigue         Anemia, Thursday April 06, 2017     [facility]:  CBC W/ DIFFERENTIAL        Thursday April 06, 2017     LAB:  CBC WITH DIFFERENTIAL WITH PLATELET        Thursday April 06, 2017     LAB:  COMP METABOLIC PANEL (14)        Thursday April 06, 2017     LAB:  Yovanny Kasper

## (undated) NOTE — LETTER
[facility]  Conner Jewell 61 [address] [facility], [address] [place] [address]    Consent for Operation    Date: __________________    Time: _______________    1.  I authorize the performance upon Alva Garay the following operation:    Procedure(s):  ESOPHAGOGASTRODUODE procedure has been videotaped, the surgeon will obtain the original videotape. The hospital will not be responsible for storage or maintenance of this tape.     6. For the purpose of advancing medical education, I consent to the admittance of observers to t STATEMENTS REQUIRING INSERTION OR COMPLETION WERE FILLED IN.     Signature of Patient:   ___________________________    When the patient is a minor or mentally incompetent to give consent:  Signature of person authorized to consent for patient: ____________ drugs/illegal medications). Failure to inform my anesthesiologist about these medicines may increase my risk of anesthetic complications. · If I am allergic to anything or have had a reaction to anesthesia before.     3. I understand how the anesthesia med I have discussed the procedure and information above with the patient (or patient’s representative) and answered their questions. The patient or their representative has agreed to have anesthesia services.     _______________________________________________

## (undated) NOTE — MR AVS SNAPSHOT
After Visit Summary   5/31/2017    Ottoniellavell Cornel    MRN: [id number]           Diagnoses this Visit     Hypokalemia    -  Primary     Anemia, unspecified type         Acute cystitis without hematuria           Allergies     Ibuprofen Unknown    po Creatinine 0.99  0.55-1.02  mg/dL Final    GFR 52 (L) >=60   Final    Comment:       Estimated GFR units: mL/min/1.73 square meters   eGFR calculated by the CKD-EPI equation.         Calcium, Total 9.0  8.3-10.3  mg/dL Final    Comment:       Total Calcium Immature Granulocyte % 0.4   % Final               MyChart     Visit MyChart  You can access your MyChart to more actively manage your health care and view more details from this visit by going to [email or web address].   If you've recently had a st

## (undated) NOTE — Clinical Note
Contacted pt for condition update since d/c from [facility], spoke to Scott Zuleta dtr per Víctor. She stated her mother has been sent to rehab at [facility] in [facility]. Unable to complete TCM at this time. Thank you.

## (undated) NOTE — ED AVS SNAPSHOT
[facility] Emergency Department    [place]  [address] [address]    Phone:  [phone]    Fax:  [phone]           Anais Hammond   MRN: [id number]    Department:  [facility] Emergency Department   Date of Visit:  6/1 IF THERE IS ANY CHANGE OR WORSENING OF YOUR CONDITION, CALL YOUR PRIMARY CARE PHYSICIAN AT ONCE OR RETURN IMMEDIATELY TO THE EMERGENCY DEPARTMENT.     If you have been prescribed any medication(s), please fill your prescription right away and begin taking t

## (undated) NOTE — MR AVS SNAPSHOT
[facility] [place]  [address] [facility], [address] [facility] [id number] [phone]               Thank you for choosing us for your health care visit with Mahendra Knox MD.  We are glad to serve you and happy to provide you with this baugh Reason for Today's Visit     Neuropathy           Medical Issues Discussed Today     Denervation atrophy of muscle    IBD (inflammatory bowel disease)    Weakness of both lower extremities        Neuropathy          Instructions and Information about Your ? Written prescriptions must be picked up in office. ? Please allow the office 48-72 hours to fill the prescription. ? Patient must present photo ID at time of .       Scheduling Tests    If your physician has ordered radiology tests such as MRI o the winter months, keep them well wrapped. Cold extremities will affect the results of the conduction studies, even though the limbs may be warmed at the clinic on the testing day. 3. Wear loose clothing or pants.   You may bring a pair of shorts to wea Take 100 mg by mouth 2 (two) times daily. Commonly known as:  ZOLOFT           SYNTHROID 50 MCG Tabs   Generic drug:  Levothyroxine Sodium   Take 50 mcg by mouth before breakfast.           VITAMIN B-12 OR   Take 1 capsule by mouth daily.            Vitam

## (undated) NOTE — ED AVS SNAPSHOT
[facility] in [address]    Phone:  [phone]    Fax:  [phone]           Sebastian Paul   MRN: [id number]    Department:  [facility]   Date of Visit:  6/14/2017 Si usted tiene algun problema con baugh sequimiento, por favor llame a nuestro adminstrador de jaycob al [phone]. Expect to receive an electronic request (by e-mail or text) to complete a self-assessment the day after your visit.   You may also recei Eliud Thurston [address] ([address] [facility]) Miriam [address] Elsie Ramírez. ([facility]) [address]  ([address]) [address] [facility] office, you can view your past visit information in [employer] by going to Visits < Visit Summaries. [employer] questions? Call [phone] for help. [employer] is NOT to be used for urgent needs. For medical emergencies, dial 911.

## (undated) NOTE — MR AVS SNAPSHOT
[facility] Billy Jaimes [address] [place] [address] [phone]               Thank you for choosing us for your health care visit with [facility] NURSE.   We are glad to serve you and happy to provide you with this summary Commonly known as:  HYZAAR           Magnesium 500 MG Tabs   Take 1 tablet by mouth daily. multivitamin Tabs   Take 1 tablet by mouth daily. Omeprazole 40 MG Cpdr   Take 40 mg by mouth daily.            potassium chloride 20 MEQ Pack   T not sign up before the expiration date, you must request a new code. Your unique [email or web address] Access Code: [email or web address]  Expires: 3/5/2017  9:04 AM    If you have questions, you can call [phone] to talk to our [facility] Staff.  Remember, Darrius pittman

## (undated) NOTE — MR AVS SNAPSHOT
[address] [facility] [address]  [phone]               Thank you for choosing us for your health care visit with Suezanne Fothergill, MD.  We are glad to serve you and happy to provide you with this summary of your visi Take 2 mg by mouth 2 (two) times daily as needed for Diarrhea. Losartan Potassium 50 MG Tabs   Take 50 mg by mouth daily. Commonly known as:  COZAAR           Magnesium 500 MG Tabs   Take 1 tablet by mouth daily.            multivitamin Tabs   T

## (undated) NOTE — MR AVS SNAPSHOT
[facility]  [address] [facility], [address]  [address] [phone]               Thank you for choosing us for your health care visit with Suezanne Fothergill, MD.  We are glad to serve you and happy to provide you with this baugh ? By law, narcotics cannot be faxed or phoned into your pharmacy. The prescription must be signed by the provider, picked up in our office and physically brought to the pharmacy. A 30 day supply with no refills is the maximum allowed.   ? If your prescript approved and is a COVERED benefit. Although the [facility] staff does its due diligence, the insurance carrier gives the disclaimer that \"Although the procedure is authorized, this does not guarantee payment. \"    Ultimately the patient is responsible for payme Cholestyramine 4 GM/DOSE Powd   Take 2 g by mouth 2 (two) times daily as needed. Fludrocortisone Acetate 0.1 MG Tabs   Take 0.1 mg by mouth daily.    Commonly known as:  FLORINEF           IMODIUM A-D 2 MG Tabs   Generic drug:  Loperamide HCl   T Follow-up Instructions     Return in about 3 months (around 5/1/2017). MyChart     Visit [employer]  You can access your [employer] to more actively manage your health care and view more details from this visit by going to [email or web address].

## (undated) NOTE — ED AVS SNAPSHOT
[facility] Emergency Department    [place]  [address] [address]    Phone:  [phone]    Fax:  [phone]           Armindaramonita Isabelle   MRN: [id number]    Department:  [facility] Emergency Department   Date of Visit:  6/1 To Check ER Wait Times:  TEXT 'ERwait' to 88542      Click [email or web address]      Or call [phone]    If you have any problems with your follow-up, please call our  at [phone]    Si usted tiene algun problema con baugh sequimiento, por f I have read and understand the instructions given to me by my caregivers. 24-Hour Pharmacies        Pharmacy Address Phone Number   [address] ([address]) David Irizarry Dictated by: Kandace Hodge MD on 6/14/2017 at 23:10       Approved by: Kandace Hodge MD              Narrative:    PROCEDURE:  XR CHEST AP PORTABLE (CPT=71010)     TECHNIQUE:  AP chest radiograph was obtained.      COMPARISON:  [facility], XR CHEST PA + If you've recently had a stay at the Hospital you can access your discharge instructions in [employer] by going to Visits < Admission Summaries.  If you've been to the Emergency Department or your doctor's office, you can view your past visit information in My

## (undated) NOTE — IP AVS SNAPSHOT
[facility] [place] [address] [place], [address] ~ [phone]                Discharge Summary   3/7/2017    [address]           Admission Information        Provider Department    3/7/2017 Angelica Rodriguez MD  Endoscopy Commonly known as:  [facility] M20        Take 1 tablet (20 mEq total) by mouth daily. Manasa Plaster     [    ]    [    ]    [    ]    [    ]       Sertraline HCl 100 MG Tabs   Commonly known as:  ZOLOFT        Take 100 mg by mouth 2 (two) times daily. light-headedness or dizziness, or any other problems, contact your doctor.     **If unable to reach your doctor, please go to the [facility] Emergency Room**    - Your referring physician will receive a full report of your examination.  - If you do not 93 (02/16/17)  18 (02/16/17)  1.11 (H) (02/16/17)  9.6 (01/13/17)  80 (01/13/17)  29      Metabolic Lab Results  (Last result in the past 90 days)    ALT Bilirubin,Total Total Protein Albumin Sodium Potassium Chloride    (01/13/17)  23 (01/13/17)  0.5 (01/ MyChart questions? Call [phone] for help. [employer] is NOT to be used for urgent needs. For medical emergencies, dial 911.